# Patient Record
Sex: FEMALE | Race: WHITE | NOT HISPANIC OR LATINO | URBAN - METROPOLITAN AREA
[De-identification: names, ages, dates, MRNs, and addresses within clinical notes are randomized per-mention and may not be internally consistent; named-entity substitution may affect disease eponyms.]

---

## 2017-01-09 ENCOUNTER — APPOINTMENT (OUTPATIENT)
Dept: WOMENS IMAGING | Facility: HOSPITAL | Age: 52
End: 2017-01-09

## 2017-01-09 PROCEDURE — 77067 SCR MAMMO BI INCL CAD: CPT | Performed by: RADIOLOGY

## 2017-01-09 PROCEDURE — 77063 BREAST TOMOSYNTHESIS BI: CPT | Performed by: RADIOLOGY

## 2018-09-04 ENCOUNTER — APPOINTMENT (OUTPATIENT)
Dept: WOMENS IMAGING | Facility: HOSPITAL | Age: 53
End: 2018-09-04

## 2018-09-04 PROCEDURE — 77067 SCR MAMMO BI INCL CAD: CPT | Performed by: RADIOLOGY

## 2018-09-04 PROCEDURE — 77063 BREAST TOMOSYNTHESIS BI: CPT | Performed by: RADIOLOGY

## 2019-05-28 ENCOUNTER — OFFICE VISIT (OUTPATIENT)
Dept: SURGERY | Facility: CLINIC | Age: 54
End: 2019-05-28

## 2019-05-28 VITALS
HEIGHT: 59 IN | WEIGHT: 140 LBS | DIASTOLIC BLOOD PRESSURE: 86 MMHG | BODY MASS INDEX: 28.22 KG/M2 | HEART RATE: 92 BPM | SYSTOLIC BLOOD PRESSURE: 134 MMHG

## 2019-05-28 DIAGNOSIS — K82.8 BILIARY DYSKINESIA: Primary | ICD-10-CM

## 2019-05-28 PROCEDURE — 99203 OFFICE O/P NEW LOW 30 MIN: CPT | Performed by: SURGERY

## 2019-05-28 RX ORDER — LOSARTAN POTASSIUM 25 MG/1
25 TABLET ORAL DAILY PRN
Refills: 3 | COMMUNITY
Start: 2019-05-10

## 2019-05-28 NOTE — PROGRESS NOTES
Subjective   Suellen Miller is a 53 y.o. female is being seen for consultation today at the request of Provider, No Known    Suellen Miller is a 53 y.o. female 53-year-old female with right upper quadrant pain after fatty and greasy meals.  Pain radiates to the right back and shoulder.  Patient has previous laparoscopic surgery related to ovarian cyst.  She has an infra umbilical incision.  The patient had a scan shows ejection fraction less than 35%.  She is on no anticoagulation.        Past Medical History:   Diagnosis Date   • Hypertension        Family History   Problem Relation Age of Onset   • Cancer Mother    • Heart disease Father    • Diabetes Father    • Cancer Father    • Hypertension Neg Hx        Social History     Socioeconomic History   • Marital status: Unknown     Spouse name: Not on file   • Number of children: Not on file   • Years of education: Not on file   • Highest education level: Not on file   Tobacco Use   • Smoking status: Never Smoker   • Smokeless tobacco: Never Used   Substance and Sexual Activity   • Alcohol use: No     Frequency: Never   • Drug use: No   • Sexual activity: No       Past Surgical History:   Procedure Laterality Date   • COLONOSCOPY     • DIAGNOSTIC LAPAROSCOPY         Review of Systems   Constitutional: Negative for activity change, appetite change, chills and fever.   HENT: Negative for sore throat and trouble swallowing.    Eyes: Negative for visual disturbance.   Respiratory: Negative for cough and shortness of breath.    Cardiovascular: Negative for chest pain and palpitations.   Gastrointestinal: Positive for abdominal pain and nausea. Negative for abdominal distention, blood in stool, constipation, diarrhea and vomiting.   Endocrine: Negative for cold intolerance and heat intolerance.   Genitourinary: Negative for dysuria.   Musculoskeletal: Negative for joint swelling.   Skin: Negative for color change, rash and wound.   Allergic/Immunologic: Negative for  "immunocompromised state.   Neurological: Negative for dizziness, seizures, weakness and headaches.   Hematological: Negative for adenopathy. Does not bruise/bleed easily.   Psychiatric/Behavioral: Negative for agitation and confusion.         /86   Pulse 92   Ht 149.9 cm (59\")   Wt 63.5 kg (140 lb)   LMP  (LMP Unknown)   BMI 28.28 kg/m²   Objective   Physical Exam   Constitutional: She is oriented to person, place, and time. She appears well-developed.   HENT:   Head: Normocephalic and atraumatic.   Mouth/Throat: Mucous membranes are normal.   Eyes: Conjunctivae are normal. Pupils are equal, round, and reactive to light.   Neck: Neck supple. No JVD present. No tracheal deviation present. No thyromegaly present.   Cardiovascular: Normal rate and regular rhythm. Exam reveals no gallop and no friction rub.   No murmur heard.  Pulmonary/Chest: Effort normal and breath sounds normal.   Abdominal: Soft. She exhibits no distension. There is no splenomegaly or hepatomegaly. There is no tenderness. No hernia.   Musculoskeletal: Normal range of motion. She exhibits no deformity.   Neurological: She is alert and oriented to person, place, and time.   Skin: Skin is warm and dry.   Psychiatric: She has a normal mood and affect.             Assessment   Suellen was seen today for biliary dyskinesia.    Diagnoses and all orders for this visit:    Biliary dyskinesia  -     Case Request; Standing  -     CBC and Differential; Future  -     Comprehensive metabolic panel; Future  -     metroNIDAZOLE (FLAGYL) IVPB 500 mg  -     Case Request    Other orders  -     Obtain informed consent  -     Provide NPO Instructions to Patient; Future  -     Clorhexidine skin prep  -     Follow Anesthesia Guidelines / Standing Orders; Standing  -     Verify NPO Status; Standing  -     Obtain informed consent; Standing  -     SCD (sequential compression device)- to be placed on patient in Pre-op; Standing  -     Verify / Perform Chlorhexidine " Skin Prep if Indicated (If Not Already Completed); Standing  -     Instructions on coughing, deep breathing, and incentive spirometry.; Standing      Suellen Miller is a 53 y.o. female with biliary dyskinesia.  The risks and benefits of surgery have been discussed with the patient and she will proceed with robotic cholecystectomy and is aware the risks and benefits of the procedure.    Patient's Body mass index is 28.28 kg/m². BMI is above normal parameters. Recommendations include: educational material.

## 2019-05-30 ENCOUNTER — APPOINTMENT (OUTPATIENT)
Dept: PREADMISSION TESTING | Facility: HOSPITAL | Age: 54
End: 2019-05-30

## 2019-05-30 DIAGNOSIS — K82.8 BILIARY DYSKINESIA: ICD-10-CM

## 2019-05-30 LAB
ALBUMIN SERPL-MCNC: 5.14 G/DL (ref 3.5–5.2)
ALBUMIN/GLOB SERPL: 1.9 G/DL
ALP SERPL-CCNC: 91 U/L (ref 39–117)
ALT SERPL W P-5'-P-CCNC: 36 U/L (ref 1–33)
ANION GAP SERPL CALCULATED.3IONS-SCNC: 12.8 MMOL/L
AST SERPL-CCNC: 22 U/L (ref 1–32)
BASOPHILS # BLD AUTO: 0.04 10*3/MM3 (ref 0–0.2)
BASOPHILS NFR BLD AUTO: 0.7 % (ref 0–1.5)
BILIRUB SERPL-MCNC: 0.4 MG/DL (ref 0.2–1.2)
BUN BLD-MCNC: 14 MG/DL (ref 6–20)
BUN/CREAT SERPL: 15.7 (ref 7–25)
CALCIUM SPEC-SCNC: 10.3 MG/DL (ref 8.6–10.5)
CHLORIDE SERPL-SCNC: 103 MMOL/L (ref 98–107)
CO2 SERPL-SCNC: 27.2 MMOL/L (ref 22–29)
CREAT BLD-MCNC: 0.89 MG/DL (ref 0.57–1)
DEPRECATED RDW RBC AUTO: 43 FL (ref 37–54)
EOSINOPHIL # BLD AUTO: 0.06 10*3/MM3 (ref 0–0.4)
EOSINOPHIL NFR BLD AUTO: 1.1 % (ref 0.3–6.2)
ERYTHROCYTE [DISTWIDTH] IN BLOOD BY AUTOMATED COUNT: 13.2 % (ref 12.3–15.4)
GFR SERPL CREATININE-BSD FRML MDRD: 66 ML/MIN/1.73
GLOBULIN UR ELPH-MCNC: 2.7 GM/DL
GLUCOSE BLD-MCNC: 126 MG/DL (ref 65–99)
HCT VFR BLD AUTO: 46.3 % (ref 34–46.6)
HGB BLD-MCNC: 14.9 G/DL (ref 12–15.9)
IMM GRANULOCYTES # BLD AUTO: 0 10*3/MM3 (ref 0–0.05)
IMM GRANULOCYTES NFR BLD AUTO: 0 % (ref 0–0.5)
LYMPHOCYTES # BLD AUTO: 1.63 10*3/MM3 (ref 0.7–3.1)
LYMPHOCYTES NFR BLD AUTO: 30.3 % (ref 19.6–45.3)
MCH RBC QN AUTO: 29 PG (ref 26.6–33)
MCHC RBC AUTO-ENTMCNC: 32.2 G/DL (ref 31.5–35.7)
MCV RBC AUTO: 90.1 FL (ref 79–97)
MONOCYTES # BLD AUTO: 0.46 10*3/MM3 (ref 0.1–0.9)
MONOCYTES NFR BLD AUTO: 8.6 % (ref 5–12)
NEUTROPHILS # BLD AUTO: 3.19 10*3/MM3 (ref 1.7–7)
NEUTROPHILS NFR BLD AUTO: 59.3 % (ref 42.7–76)
PLATELET # BLD AUTO: 291 10*3/MM3 (ref 140–450)
PMV BLD AUTO: 11.3 FL (ref 6–12)
POTASSIUM BLD-SCNC: 4.3 MMOL/L (ref 3.5–5.2)
PROT SERPL-MCNC: 7.8 G/DL (ref 6–8.5)
RBC # BLD AUTO: 5.14 10*6/MM3 (ref 3.77–5.28)
SODIUM BLD-SCNC: 143 MMOL/L (ref 136–145)
WBC NRBC COR # BLD: 5.38 10*3/MM3 (ref 3.4–10.8)

## 2019-05-30 PROCEDURE — 80053 COMPREHEN METABOLIC PANEL: CPT | Performed by: SURGERY

## 2019-05-30 PROCEDURE — 85025 COMPLETE CBC W/AUTO DIFF WBC: CPT | Performed by: SURGERY

## 2019-05-30 PROCEDURE — 36415 COLL VENOUS BLD VENIPUNCTURE: CPT

## 2019-06-07 ENCOUNTER — ANESTHESIA EVENT (OUTPATIENT)
Dept: PERIOP | Facility: HOSPITAL | Age: 54
End: 2019-06-07

## 2019-06-07 ENCOUNTER — ANESTHESIA (OUTPATIENT)
Dept: PERIOP | Facility: HOSPITAL | Age: 54
End: 2019-06-07

## 2019-06-07 ENCOUNTER — HOSPITAL ENCOUNTER (OUTPATIENT)
Facility: HOSPITAL | Age: 54
Setting detail: HOSPITAL OUTPATIENT SURGERY
Discharge: HOME | End: 2019-06-07
Attending: SURGERY | Admitting: SURGERY

## 2019-06-07 VITALS
RESPIRATION RATE: 16 BRPM | TEMPERATURE: 97.8 F | HEART RATE: 74 BPM | SYSTOLIC BLOOD PRESSURE: 142 MMHG | OXYGEN SATURATION: 99 % | DIASTOLIC BLOOD PRESSURE: 86 MMHG

## 2019-06-07 DIAGNOSIS — K82.8 BILIARY DYSKINESIA: ICD-10-CM

## 2019-06-07 PROCEDURE — 25010000002 NEOSTIGMINE 10 MG/10ML SOLUTION: Performed by: NURSE ANESTHETIST, CERTIFIED REGISTERED

## 2019-06-07 PROCEDURE — 25010000002 FENTANYL CITRATE (PF) 100 MCG/2ML SOLUTION: Performed by: NURSE ANESTHETIST, CERTIFIED REGISTERED

## 2019-06-07 PROCEDURE — 25010000002 KETOROLAC TROMETHAMINE PER 15 MG: Performed by: NURSE ANESTHETIST, CERTIFIED REGISTERED

## 2019-06-07 PROCEDURE — 25010000002 DEXAMETHASONE PER 1 MG: Performed by: NURSE ANESTHETIST, CERTIFIED REGISTERED

## 2019-06-07 PROCEDURE — 25010000002 PROPOFOL 10 MG/ML EMULSION: Performed by: NURSE ANESTHETIST, CERTIFIED REGISTERED

## 2019-06-07 PROCEDURE — 94799 UNLISTED PULMONARY SVC/PX: CPT

## 2019-06-07 PROCEDURE — 47562 LAPAROSCOPIC CHOLECYSTECTOMY: CPT | Performed by: SURGERY

## 2019-06-07 PROCEDURE — S2900 ROBOTIC SURGICAL SYSTEM: HCPCS | Performed by: SURGERY

## 2019-06-07 PROCEDURE — 25010000002 ONDANSETRON PER 1 MG: Performed by: NURSE ANESTHETIST, CERTIFIED REGISTERED

## 2019-06-07 RX ORDER — NEOSTIGMINE METHYLSULFATE 1 MG/ML
INJECTION, SOLUTION INTRAVENOUS AS NEEDED
Status: DISCONTINUED | OUTPATIENT
Start: 2019-06-07 | End: 2019-06-07 | Stop reason: SURG

## 2019-06-07 RX ORDER — FENTANYL CITRATE 50 UG/ML
INJECTION, SOLUTION INTRAMUSCULAR; INTRAVENOUS AS NEEDED
Status: DISCONTINUED | OUTPATIENT
Start: 2019-06-07 | End: 2019-06-07 | Stop reason: SURG

## 2019-06-07 RX ORDER — GLYCOPYRROLATE 0.2 MG/ML
INJECTION INTRAMUSCULAR; INTRAVENOUS AS NEEDED
Status: DISCONTINUED | OUTPATIENT
Start: 2019-06-07 | End: 2019-06-07 | Stop reason: SURG

## 2019-06-07 RX ORDER — LABETALOL HYDROCHLORIDE 5 MG/ML
INJECTION, SOLUTION INTRAVENOUS AS NEEDED
Status: DISCONTINUED | OUTPATIENT
Start: 2019-06-07 | End: 2019-06-07 | Stop reason: SURG

## 2019-06-07 RX ORDER — FAMOTIDINE 10 MG/ML
INJECTION, SOLUTION INTRAVENOUS AS NEEDED
Status: DISCONTINUED | OUTPATIENT
Start: 2019-06-07 | End: 2019-06-07 | Stop reason: SURG

## 2019-06-07 RX ORDER — MEPERIDINE HYDROCHLORIDE 25 MG/ML
12.5 INJECTION INTRAMUSCULAR; INTRAVENOUS; SUBCUTANEOUS
Status: DISCONTINUED | OUTPATIENT
Start: 2019-06-07 | End: 2019-06-07 | Stop reason: HOSPADM

## 2019-06-07 RX ORDER — SODIUM CHLORIDE 0.9 % (FLUSH) 0.9 %
3 SYRINGE (ML) INJECTION EVERY 12 HOURS SCHEDULED
Status: DISCONTINUED | OUTPATIENT
Start: 2019-06-07 | End: 2019-06-07 | Stop reason: HOSPADM

## 2019-06-07 RX ORDER — HYDROCODONE BITARTRATE AND ACETAMINOPHEN 5; 325 MG/1; MG/1
1 TABLET ORAL EVERY 6 HOURS PRN
Qty: 10 TABLET | Refills: 0 | Status: SHIPPED | OUTPATIENT
Start: 2019-06-07 | End: 2022-12-12

## 2019-06-07 RX ORDER — VECURONIUM BROMIDE 1 MG/ML
INJECTION, POWDER, LYOPHILIZED, FOR SOLUTION INTRAVENOUS AS NEEDED
Status: DISCONTINUED | OUTPATIENT
Start: 2019-06-07 | End: 2019-06-07 | Stop reason: SURG

## 2019-06-07 RX ORDER — OXYCODONE HYDROCHLORIDE AND ACETAMINOPHEN 5; 325 MG/1; MG/1
1 TABLET ORAL ONCE AS NEEDED
Status: DISCONTINUED | OUTPATIENT
Start: 2019-06-07 | End: 2019-06-07 | Stop reason: HOSPADM

## 2019-06-07 RX ORDER — ONDANSETRON 2 MG/ML
INJECTION INTRAMUSCULAR; INTRAVENOUS AS NEEDED
Status: DISCONTINUED | OUTPATIENT
Start: 2019-06-07 | End: 2019-06-07 | Stop reason: SURG

## 2019-06-07 RX ORDER — FENTANYL CITRATE 50 UG/ML
50 INJECTION, SOLUTION INTRAMUSCULAR; INTRAVENOUS
Status: DISCONTINUED | OUTPATIENT
Start: 2019-06-07 | End: 2019-06-07 | Stop reason: HOSPADM

## 2019-06-07 RX ORDER — KETOROLAC TROMETHAMINE 30 MG/ML
INJECTION, SOLUTION INTRAMUSCULAR; INTRAVENOUS AS NEEDED
Status: DISCONTINUED | OUTPATIENT
Start: 2019-06-07 | End: 2019-06-07 | Stop reason: SURG

## 2019-06-07 RX ORDER — PROPOFOL 10 MG/ML
VIAL (ML) INTRAVENOUS AS NEEDED
Status: DISCONTINUED | OUTPATIENT
Start: 2019-06-07 | End: 2019-06-07 | Stop reason: SURG

## 2019-06-07 RX ORDER — MAGNESIUM HYDROXIDE 1200 MG/15ML
LIQUID ORAL AS NEEDED
Status: DISCONTINUED | OUTPATIENT
Start: 2019-06-07 | End: 2019-06-07 | Stop reason: HOSPADM

## 2019-06-07 RX ORDER — DEXAMETHASONE SODIUM PHOSPHATE 10 MG/ML
INJECTION INTRAMUSCULAR; INTRAVENOUS AS NEEDED
Status: DISCONTINUED | OUTPATIENT
Start: 2019-06-07 | End: 2019-06-07 | Stop reason: SURG

## 2019-06-07 RX ORDER — SODIUM CHLORIDE, SODIUM LACTATE, POTASSIUM CHLORIDE, CALCIUM CHLORIDE 600; 310; 30; 20 MG/100ML; MG/100ML; MG/100ML; MG/100ML
125 INJECTION, SOLUTION INTRAVENOUS CONTINUOUS
Status: DISCONTINUED | OUTPATIENT
Start: 2019-06-07 | End: 2019-06-07 | Stop reason: HOSPADM

## 2019-06-07 RX ORDER — SODIUM CHLORIDE 0.9 % (FLUSH) 0.9 %
3-10 SYRINGE (ML) INJECTION AS NEEDED
Status: DISCONTINUED | OUTPATIENT
Start: 2019-06-07 | End: 2019-06-07 | Stop reason: HOSPADM

## 2019-06-07 RX ORDER — IPRATROPIUM BROMIDE AND ALBUTEROL SULFATE 2.5; .5 MG/3ML; MG/3ML
3 SOLUTION RESPIRATORY (INHALATION) ONCE AS NEEDED
Status: DISCONTINUED | OUTPATIENT
Start: 2019-06-07 | End: 2019-06-07 | Stop reason: HOSPADM

## 2019-06-07 RX ORDER — LIDOCAINE HYDROCHLORIDE 20 MG/ML
INJECTION, SOLUTION INFILTRATION; PERINEURAL AS NEEDED
Status: DISCONTINUED | OUTPATIENT
Start: 2019-06-07 | End: 2019-06-07 | Stop reason: SURG

## 2019-06-07 RX ORDER — ONDANSETRON 2 MG/ML
4 INJECTION INTRAMUSCULAR; INTRAVENOUS ONCE AS NEEDED
Status: DISCONTINUED | OUTPATIENT
Start: 2019-06-07 | End: 2019-06-07 | Stop reason: HOSPADM

## 2019-06-07 RX ORDER — BUPIVACAINE HYDROCHLORIDE AND EPINEPHRINE 5; 5 MG/ML; UG/ML
INJECTION, SOLUTION EPIDURAL; INTRACAUDAL; PERINEURAL AS NEEDED
Status: DISCONTINUED | OUTPATIENT
Start: 2019-06-07 | End: 2019-06-07 | Stop reason: HOSPADM

## 2019-06-07 RX ADMIN — NEOSTIGMINE METHYLSULFATE 2 MG: 1 INJECTION, SOLUTION INTRAVENOUS at 14:11

## 2019-06-07 RX ADMIN — FENTANYL CITRATE 50 MCG: 50 INJECTION INTRAMUSCULAR; INTRAVENOUS at 14:15

## 2019-06-07 RX ADMIN — FENTANYL CITRATE 100 MCG: 50 INJECTION INTRAMUSCULAR; INTRAVENOUS at 13:13

## 2019-06-07 RX ADMIN — FENTANYL CITRATE 50 MCG: 50 INJECTION INTRAMUSCULAR; INTRAVENOUS at 14:13

## 2019-06-07 RX ADMIN — PROPOFOL 150 MG: 10 INJECTION, EMULSION INTRAVENOUS at 13:13

## 2019-06-07 RX ADMIN — VECURONIUM BROMIDE 3 MG: 1 INJECTION, POWDER, LYOPHILIZED, FOR SOLUTION INTRAVENOUS at 13:13

## 2019-06-07 RX ADMIN — METRONIDAZOLE 500 MG: 500 INJECTION, SOLUTION INTRAVENOUS at 13:06

## 2019-06-07 RX ADMIN — ONDANSETRON 4 MG: 2 INJECTION, SOLUTION INTRAMUSCULAR; INTRAVENOUS at 13:06

## 2019-06-07 RX ADMIN — DEXAMETHASONE SODIUM PHOSPHATE 8 MG: 10 INJECTION INTRAMUSCULAR; INTRAVENOUS at 13:24

## 2019-06-07 RX ADMIN — GLYCOPYRROLATE 0.2 MG: 0.2 INJECTION, SOLUTION INTRAMUSCULAR; INTRAVENOUS at 13:29

## 2019-06-07 RX ADMIN — LABETALOL HYDROCHLORIDE 5 MG: 5 INJECTION, SOLUTION INTRAVENOUS at 13:39

## 2019-06-07 RX ADMIN — VECURONIUM BROMIDE 1 MG: 1 INJECTION, POWDER, LYOPHILIZED, FOR SOLUTION INTRAVENOUS at 13:53

## 2019-06-07 RX ADMIN — SODIUM CHLORIDE, POTASSIUM CHLORIDE, SODIUM LACTATE AND CALCIUM CHLORIDE 125 ML/HR: 600; 310; 30; 20 INJECTION, SOLUTION INTRAVENOUS at 12:39

## 2019-06-07 RX ADMIN — LIDOCAINE HYDROCHLORIDE 40 MG: 20 INJECTION, SOLUTION INFILTRATION; PERINEURAL at 13:13

## 2019-06-07 RX ADMIN — GLYCOPYRROLATE 0.2 MG: 0.2 INJECTION, SOLUTION INTRAMUSCULAR; INTRAVENOUS at 14:11

## 2019-06-07 RX ADMIN — KETOROLAC TROMETHAMINE 30 MG: 30 INJECTION, SOLUTION INTRAMUSCULAR; INTRAVENOUS at 14:14

## 2019-06-07 RX ADMIN — FAMOTIDINE 20 MG: 10 INJECTION, SOLUTION INTRAVENOUS at 13:06

## 2019-06-07 NOTE — ANESTHESIA POSTPROCEDURE EVALUATION
Patient: Suellen Miller    Procedure Summary     Date:  06/07/19 Room / Location:  [facility] OR  /  [facility] OR    Anesthesia Start:  1306 Anesthesia Stop:  1427    Procedure:  CHOLECYSTECTOMY LAPAROSCOPIC WITH DAVINCI ROBOT (N/A Abdomen) Diagnosis:       Biliary dyskinesia      (Biliary dyskinesia [K82.8])    Surgeon:  Rolo Sheldon MD Provider:  Esteban Boo MD    Anesthesia Type:  general ASA Status:  2          Anesthesia Type: general  Last vitals  BP   146/81 (06/07/19 1428)   Temp   97.5 °F (36.4 °C) (06/07/19 1428)   Pulse   51 (06/07/19 1428)   Resp   12 (06/07/19 1428)     SpO2   98 % (06/07/19 1428)     Post Anesthesia Care and Evaluation    Patient location during evaluation: PHASE II  Patient participation: complete - patient participated  Level of consciousness: awake and alert  Pain score: 1  Pain management: adequate  Airway patency: patent  Anesthetic complications: No anesthetic complications  PONV Status: controlled  Cardiovascular status: acceptable  Respiratory status: acceptable  Hydration status: acceptable

## 2019-06-07 NOTE — ANESTHESIA PROCEDURE NOTES
Airway  Urgency: elective    Airway not difficult    General Information and Staff    Patient location during procedure: OR  CRNA: Ladi Parr CRNA    Indications and Patient Condition  Indications for airway management: airway protection    Preoxygenated: yes  MILS maintained throughout  Mask difficulty assessment: 1 - vent by mask    Final Airway Details  Final airway type: endotracheal airway      Successful airway: ETT  Cuffed: yes   Successful intubation technique: direct laryngoscopy  Endotracheal tube insertion site: oral  Blade: Meera  Blade size: 3  ETT size (mm): 7.0  Cormack-Lehane Classification: grade IIa - partial view of glottis  Placement verified by: chest auscultation   Number of attempts at approach: 2    Additional Comments  One attempt with Mac 3 and second attempt with CMAC 3.  Small mouth opening

## 2019-06-07 NOTE — OP NOTE
CHOLECYSTECTOMY LAPAROSCOPIC WITH DAVINCI ROBOT  Procedure Note    Suellen Miller  6/7/2019    Pre-op Diagnosis:   Biliary dyskinesia [K82.8]    Post-op Diagnosis:     Post-Op Diagnosis Codes:     * Biliary dyskinesia [K82.8]    Procedure(s):  CHOLECYSTECTOMY LAPAROSCOPIC WITH DAVINCI ROBOT    Surgeon(s):  Rolo Sheldon MD    Anesthesia: General    Staff:   Circulator: Veronica Carlisle RN; Sue Baeza RN  Scrub Person: Elicia Shepard  Assistant: Hardeep Atkinson    Findings: distended gallbladder, critical view of safety obtained    Operative Procedure:  The patient was taken operating suite and placed supine on operating table.  Bilateral sequential compression devices were applied and general endotracheal anesthesia administered.  The abdomen was prepped and draped in usual sterile fashion.  Preoperative antibiotics were confirmed.  Timeout procedure was performed.              A Veress needle was inserted palmers point the left upper quadrant and saline drop test confirmed entry into the peritoneal space.  Pneumoperitoneum was established.  A 5 mm Optiview trocar was inserted at the Veress needle entry site and there is no evidence of underlying injury.  An 8 mm camera trocar was then placed through an infra umbilical incision.  A right lateral 8 mm trocar was placed in the midclavicular line.  The 5 mm left upper quadrant trocar was exchanged for an 8 mm trocar.  The 5 mm trocar was then placed laterally as a retraction port.  The robot was then docked.  The fundus of the gallbladder was grasped and retracted anteriorly and superiorly.  The infundibulum was retracted laterally inferiorly.  The peritoneum on the anterior and posterior surface of the gallbladder was opened with cautery dissection.  The cystic duct and artery were dissected free circumferentially.  The gallbladder was elevated from the gallbladder fossa for portion.  The cystic plate was then visible from the anterior posterior views with  only the cystic duct and artery entering the gallbladder.  With the critical view safety obtained hemoclips were applied distally x1 to the cystic artery and 2 proximal and one distal on the cystic duct.  The cystic artery was then divided with cautery and the cystic duct divided with the hook using the cut setting.  The gallbladder was then removed from the gallbladder fossa intact.  The gallbladder was then removed from the gallbladder fossa with cautery hook.  The gallbladder was placed in an Endo Catch bag was removed through the infraumbilical fascial defect.  The gallbladder fossa was then made hemostatic and all ports removed under direct visualization.  Pneumoperitoneum was evacuated and all skin incisions were closed with Monocryl subcuticular suture after closure of the infra umbilical fascial defect with Vicryl suture.  Counts were correct.        Estimated Blood Loss: 10mL    Specimens:   gallbladder                 Drains: none    Grafts/Implants:  none    Complications: none      Rolo Sheldon MD     Date: 6/7/2019  Time: 2:18 PM

## 2019-06-07 NOTE — ANESTHESIA PREPROCEDURE EVALUATION
Anesthesia Evaluation     Patient summary reviewed and Nursing notes reviewed   history of anesthetic complications: prolonged sedation  NPO Solid Status: > 8 hours  NPO Liquid Status: > 8 hours           Airway   Mallampati: II  TM distance: >3 FB  Neck ROM: full  no difficulty expected  Dental - normal exam     Pulmonary - negative pulmonary ROS and normal exam   (-) asthma, not a smoker  Cardiovascular - normal exam  Exercise tolerance: good (4-7 METS)    NYHA Classification: II    (+) hypertension, hyperlipidemia,   (-) past MI, dysrhythmias, angina, CHF      Neuro/Psych- negative ROS  (-) seizures, CVA  GI/Hepatic/Renal/Endo - negative ROS   (-) GERD, liver disease, no renal disease, diabetes, hypothyroidism    Musculoskeletal (-) negative ROS    Abdominal  - normal exam    Bowel sounds: normal.   Substance History - negative use     OB/GYN negative ob/gyn ROS         Other - negative ROS       (-) arthritis                  Anesthesia Plan    ASA 2     general     intravenous induction   Anesthetic plan, all risks, benefits, and alternatives have been provided, discussed and informed consent has been obtained with: patient and spouse/significant other.  Use of blood products discussed with patient and spouse/significant other  Consented to blood products.

## 2019-06-11 LAB
LAB AP CASE REPORT: NORMAL
PATH REPORT.FINAL DX SPEC: NORMAL

## 2019-06-26 ENCOUNTER — OFFICE VISIT (OUTPATIENT)
Dept: SURGERY | Facility: CLINIC | Age: 54
End: 2019-06-26

## 2019-06-26 VITALS
HEIGHT: 59 IN | WEIGHT: 140 LBS | DIASTOLIC BLOOD PRESSURE: 71 MMHG | HEART RATE: 74 BPM | SYSTOLIC BLOOD PRESSURE: 130 MMHG | BODY MASS INDEX: 28.22 KG/M2

## 2019-06-26 DIAGNOSIS — K82.8 BILIARY DYSKINESIA: Primary | ICD-10-CM

## 2019-06-26 PROCEDURE — 99024 POSTOP FOLLOW-UP VISIT: CPT | Performed by: SURGERY

## 2019-06-28 NOTE — PROGRESS NOTES
Subjective   Suellen Miller is a 53 y.o. female  is here today for follow-up.         Suellen Miller is a 53 y.o. female here for followup after robotic cholecystectomy.  The patient is doing well and tolerating diet.  Their incisions are healing well.  No complaints of pain postoperatively or any other issues reported.    Pathology consistent with chronic cholecystitis    Physical Exam:  NAD, AOx3  RRR  CTAB  S/appropriately tender/incisions healing well          Suellen was seen today for s/p lap delphine.    Diagnoses and all orders for this visit:    Biliary dyskinesia        Assessment     53 y.o. female s/p cholecystectomy secondary to biliary dyskinesia.  The patient is doing well without complication.  She will follow-up as needed.

## 2020-01-06 ENCOUNTER — APPOINTMENT (OUTPATIENT)
Dept: WOMENS IMAGING | Facility: HOSPITAL | Age: 55
End: 2020-01-06

## 2020-01-06 PROCEDURE — 77063 BREAST TOMOSYNTHESIS BI: CPT | Performed by: RADIOLOGY

## 2020-01-06 PROCEDURE — 77067 SCR MAMMO BI INCL CAD: CPT | Performed by: RADIOLOGY

## 2021-03-03 ENCOUNTER — APPOINTMENT (OUTPATIENT)
Dept: WOMENS IMAGING | Facility: HOSPITAL | Age: 56
End: 2021-03-03

## 2021-03-03 PROCEDURE — 77063 BREAST TOMOSYNTHESIS BI: CPT | Performed by: RADIOLOGY

## 2021-03-03 PROCEDURE — 77067 SCR MAMMO BI INCL CAD: CPT | Performed by: RADIOLOGY

## 2021-10-12 ENCOUNTER — OFFICE VISIT (OUTPATIENT)
Dept: SURGERY | Facility: CLINIC | Age: 56
End: 2021-10-12

## 2021-10-12 VITALS
HEART RATE: 92 BPM | DIASTOLIC BLOOD PRESSURE: 84 MMHG | WEIGHT: 136 LBS | HEIGHT: 59 IN | SYSTOLIC BLOOD PRESSURE: 132 MMHG | BODY MASS INDEX: 27.42 KG/M2

## 2021-10-12 DIAGNOSIS — R10.11 RUQ PAIN: Primary | ICD-10-CM

## 2021-10-12 DIAGNOSIS — K82.8 BILIARY DYSKINESIA: ICD-10-CM

## 2021-10-12 PROCEDURE — 99213 OFFICE O/P EST LOW 20 MIN: CPT | Performed by: SURGERY

## 2021-10-12 RX ORDER — DOCUSATE SODIUM 250 MG
250 CAPSULE ORAL 2 TIMES DAILY PRN
Qty: 60 CAPSULE | Refills: 1 | Status: SHIPPED | OUTPATIENT
Start: 2021-10-12

## 2021-10-13 NOTE — PROGRESS NOTES
Subjective   Suellen Miller is a 56 y.o. female is being seen for consultation today at the request of Piter Trevino MD    Suellen Miller is a 56 y.o. female With new onset right upper quadrant pain for the past week.  Pain radiates to the right back and is intermittent and associated with food.  She has undergone cholecystectomy in 2019.  No signs of liver disease or complication from cholecystectomy.  She reports normal daily bowel movements but recently underwent antibiotic therapy and has been taking a probiotic.      Past Medical History:   Diagnosis Date   • Dysfunctional gallbladder    • Elevated cholesterol    • Hypertension        Family History   Problem Relation Age of Onset   • Cancer Mother    • Heart disease Father    • Diabetes Father    • Cancer Father    • Hypertension Neg Hx        Social History     Socioeconomic History   • Marital status:    Tobacco Use   • Smoking status: Never Smoker   • Smokeless tobacco: Never Used   Vaping Use   • Vaping Use: Never used   Substance and Sexual Activity   • Alcohol use: No   • Drug use: No   • Sexual activity: Never       Past Surgical History:   Procedure Laterality Date   • CHOLECYSTECTOMY N/A 6/7/2019    Procedure: CHOLECYSTECTOMY LAPAROSCOPIC WITH DAVINCI SI ROBOT;  Surgeon: Rolo Sheldon MD;  Location: [facility];  Service: DaVinci   • COLONOSCOPY     • DIAGNOSTIC LAPAROSCOPY         Review of Systems   Constitutional: Negative for activity change, appetite change, chills and fever.   HENT: Negative for sore throat and trouble swallowing.    Eyes: Negative for visual disturbance.   Respiratory: Negative for cough and shortness of breath.    Cardiovascular: Negative for chest pain and palpitations.   Gastrointestinal: Positive for abdominal pain. Negative for abdominal distention, blood in stool, constipation, diarrhea, nausea and vomiting.   Endocrine: Negative for cold intolerance and heat intolerance.   Genitourinary: Negative for  "dysuria.   Musculoskeletal: Negative for joint swelling.   Skin: Negative for color change, rash and wound.   Allergic/Immunologic: Negative for immunocompromised state.   Neurological: Negative for dizziness, seizures, weakness and headaches.   Hematological: Negative for adenopathy. Does not bruise/bleed easily.   Psychiatric/Behavioral: Negative for agitation and confusion.         /84   Pulse 92   Ht 149.9 cm (59.02\")   Wt 61.7 kg (136 lb)   LMP  (LMP Unknown)   BMI 27.45 kg/m²   Objective   Physical Exam  Constitutional:       Appearance: She is well-developed.   HENT:      Head: Normocephalic and atraumatic.   Eyes:      Conjunctiva/sclera: Conjunctivae normal.      Pupils: Pupils are equal, round, and reactive to light.   Neck:      Thyroid: No thyromegaly.      Vascular: No JVD.      Trachea: No tracheal deviation.   Cardiovascular:      Rate and Rhythm: Normal rate and regular rhythm.      Heart sounds: No murmur heard.  No friction rub. No gallop.    Pulmonary:      Effort: Pulmonary effort is normal.      Breath sounds: Normal breath sounds.   Abdominal:      General: There is no distension.      Palpations: Abdomen is soft. There is no hepatomegaly or splenomegaly.      Tenderness: There is no abdominal tenderness.      Hernia: No hernia is present.   Musculoskeletal:         General: No deformity. Normal range of motion.      Cervical back: Neck supple.   Skin:     General: Skin is warm and dry.   Neurological:      Mental Status: She is alert and oriented to person, place, and time.               Assessment   Diagnoses and all orders for this visit:    1. RUQ pain (Primary)  -     XR abdomen kub; Future    2. Biliary dyskinesia    Other orders  -     SCANNED - LABS  -     docusate sodium (COLACE) 250 MG capsule; Take 1 capsule by mouth 2 (Two) Times a Day As Needed for Constipation.  Dispense: 60 capsule; Refill: 1      Suellen Miller is a 56 y.o. female with right upper quadrant pain.  She is " well out from her cholecystectomy and has no signs of there is complication related to cholecystectomy.  She has recently been on antibiotics secondary to Covid pneumonia from which she is now out of quarantine.  I believe she is suffering from constipation and KUB confirms constipation.  Small bowel regimen of stool softeners fiber and water have been prescribed for the patient.  Follow-up in 2 weeks.  Patient's Body mass index is 27.45 kg/m². indicating that she is within normal range (BMI 18.5-24.9). No BMI management plan needed..

## 2021-10-20 ENCOUNTER — OFFICE VISIT (OUTPATIENT)
Dept: SURGERY | Facility: CLINIC | Age: 56
End: 2021-10-20

## 2021-10-20 VITALS — HEIGHT: 59 IN | BODY MASS INDEX: 27.42 KG/M2 | WEIGHT: 136 LBS

## 2021-10-20 DIAGNOSIS — K82.8 BILIARY DYSKINESIA: Primary | ICD-10-CM

## 2021-10-20 PROCEDURE — 99213 OFFICE O/P EST LOW 20 MIN: CPT | Performed by: SURGERY

## 2021-10-21 NOTE — PROGRESS NOTES
Subjective   Suellen Miller is a 56 y.o. female is being seen for consultation today at the request of Piter Trevino MD    Suellen Miller is a 56 y.o. female With new onset right upper quadrant pain for the past week.  Pain radiates to the right back and is intermittent and associated with food.  She has undergone cholecystectomy in 2019.  No signs of liver disease or complication from cholecystectomy.  She reports normal daily bowel movements but recently underwent antibiotic therapy and has been taking a probiotic.  Her bowel movements have improved with stool softener but we will arrange her regimen to accommodate her current schedule.      Past Medical History:   Diagnosis Date   • Dysfunctional gallbladder    • Elevated cholesterol    • Hypertension        Family History   Problem Relation Age of Onset   • Cancer Mother    • Heart disease Father    • Diabetes Father    • Cancer Father    • Hypertension Neg Hx        Social History     Socioeconomic History   • Marital status:    Tobacco Use   • Smoking status: Never Smoker   • Smokeless tobacco: Never Used   Vaping Use   • Vaping Use: Never used   Substance and Sexual Activity   • Alcohol use: No   • Drug use: No   • Sexual activity: Never       Past Surgical History:   Procedure Laterality Date   • CHOLECYSTECTOMY N/A 6/7/2019    Procedure: CHOLECYSTECTOMY LAPAROSCOPIC WITH DAVINCI SI ROBOT;  Surgeon: Rolo Sheldon MD;  Location: [facility];  Service: DaVinci   • COLONOSCOPY     • DIAGNOSTIC LAPAROSCOPY         Review of Systems   Constitutional: Negative for activity change, appetite change, chills and fever.   HENT: Negative for sore throat and trouble swallowing.    Eyes: Negative for visual disturbance.   Respiratory: Negative for cough and shortness of breath.    Cardiovascular: Negative for chest pain and palpitations.   Gastrointestinal: Positive for abdominal pain. Negative for abdominal distention, blood in stool, constipation,  "diarrhea, nausea and vomiting.   Endocrine: Negative for cold intolerance and heat intolerance.   Genitourinary: Negative for dysuria.   Musculoskeletal: Negative for joint swelling.   Skin: Negative for color change, rash and wound.   Allergic/Immunologic: Negative for immunocompromised state.   Neurological: Negative for dizziness, seizures, weakness and headaches.   Hematological: Negative for adenopathy. Does not bruise/bleed easily.   Psychiatric/Behavioral: Negative for agitation and confusion.         Ht 149.9 cm (59.02\")   Wt 61.7 kg (136 lb)   LMP  (LMP Unknown)   BMI 27.45 kg/m²   Objective   Physical Exam  Constitutional:       Appearance: She is well-developed.   HENT:      Head: Normocephalic and atraumatic.   Eyes:      Conjunctiva/sclera: Conjunctivae normal.      Pupils: Pupils are equal, round, and reactive to light.   Neck:      Thyroid: No thyromegaly.      Vascular: No JVD.      Trachea: No tracheal deviation.   Cardiovascular:      Rate and Rhythm: Normal rate and regular rhythm.      Heart sounds: No murmur heard.  No friction rub. No gallop.    Pulmonary:      Effort: Pulmonary effort is normal.      Breath sounds: Normal breath sounds.   Abdominal:      General: There is no distension.      Palpations: Abdomen is soft. There is no hepatomegaly or splenomegaly.      Tenderness: There is no abdominal tenderness.      Hernia: No hernia is present.   Musculoskeletal:         General: No deformity. Normal range of motion.      Cervical back: Neck supple.   Skin:     General: Skin is warm and dry.   Neurological:      Mental Status: She is alert and oriented to person, place, and time.               Assessment   Diagnoses and all orders for this visit:    1. Biliary dyskinesia (Primary)      Suellen Miller is a 56 y.o. female with right upper quadrant pain.  She is well out from her cholecystectomy and has no signs of there is complication related to cholecystectomy.  She has recently been on " antibiotics secondary to Covid pneumonia from which she is now out of quarantine.  I believe she is suffering from constipation and KUB confirms constipation.  Small bowel regimen of stool softeners fiber and water have been prescribed for the patient.  She will continue current bowel regimen and follow-up in 1 month.        Patient's Body mass index is 27.45 kg/m². indicating that she is within normal range (BMI 18.5-24.9). No BMI management plan needed..

## 2021-11-16 ENCOUNTER — OFFICE VISIT (OUTPATIENT)
Dept: SURGERY | Facility: CLINIC | Age: 56
End: 2021-11-16

## 2021-11-16 VITALS — WEIGHT: 136 LBS | BODY MASS INDEX: 27.42 KG/M2 | HEIGHT: 59 IN

## 2021-11-16 DIAGNOSIS — K82.8 BILIARY DYSKINESIA: Primary | ICD-10-CM

## 2021-11-16 PROCEDURE — 99212 OFFICE O/P EST SF 10 MIN: CPT | Performed by: SURGERY

## 2021-11-17 NOTE — PROGRESS NOTES
Subjective   Suellen iMller is a 56 y.o. female is being seen for consultation today at the request of Piter Trevino MD    Suellen Miller is a 56 y.o. female With new onset right upper quadrant pain for the past week.  Pain radiates to the right back and is intermittent and associated with food.  She has undergone cholecystectomy in 2019.  No signs of liver disease or complication from cholecystectomy.  She reports normal daily bowel movements but recently underwent antibiotic therapy and has been taking a probiotic.  Her bowel movements have improved with stool softener but we will arrange her regimen to accommodate her current schedule.      Past Medical History:   Diagnosis Date   • Dysfunctional gallbladder    • Elevated cholesterol    • Hypertension        Family History   Problem Relation Age of Onset   • Cancer Mother    • Heart disease Father    • Diabetes Father    • Cancer Father    • Hypertension Neg Hx        Social History     Socioeconomic History   • Marital status:    Tobacco Use   • Smoking status: Never Smoker   • Smokeless tobacco: Never Used   Vaping Use   • Vaping Use: Never used   Substance and Sexual Activity   • Alcohol use: No   • Drug use: No   • Sexual activity: Never       Past Surgical History:   Procedure Laterality Date   • CHOLECYSTECTOMY N/A 6/7/2019    Procedure: CHOLECYSTECTOMY LAPAROSCOPIC WITH DAVINCI SI ROBOT;  Surgeon: Rolo Sheldon MD;  Location: [facility];  Service: DaVinci   • COLONOSCOPY     • DIAGNOSTIC LAPAROSCOPY         Review of Systems   Constitutional: Negative for activity change, appetite change, chills and fever.   HENT: Negative for sore throat and trouble swallowing.    Eyes: Negative for visual disturbance.   Respiratory: Negative for cough and shortness of breath.    Cardiovascular: Negative for chest pain and palpitations.   Gastrointestinal: Positive for abdominal pain. Negative for abdominal distention, blood in stool, constipation,  "diarrhea, nausea and vomiting.   Endocrine: Negative for cold intolerance and heat intolerance.   Genitourinary: Negative for dysuria.   Musculoskeletal: Negative for joint swelling.   Skin: Negative for color change, rash and wound.   Allergic/Immunologic: Negative for immunocompromised state.   Neurological: Negative for dizziness, seizures, weakness and headaches.   Hematological: Negative for adenopathy. Does not bruise/bleed easily.   Psychiatric/Behavioral: Negative for agitation and confusion.         Ht 149.9 cm (59.02\")   Wt 61.7 kg (136 lb)   LMP  (LMP Unknown)   BMI 27.45 kg/m²   Objective   Physical Exam  Constitutional:       Appearance: She is well-developed.   HENT:      Head: Normocephalic and atraumatic.   Eyes:      Conjunctiva/sclera: Conjunctivae normal.      Pupils: Pupils are equal, round, and reactive to light.   Neck:      Thyroid: No thyromegaly.      Vascular: No JVD.      Trachea: No tracheal deviation.   Cardiovascular:      Rate and Rhythm: Normal rate and regular rhythm.      Heart sounds: No murmur heard.  No friction rub. No gallop.    Pulmonary:      Effort: Pulmonary effort is normal.      Breath sounds: Normal breath sounds.   Abdominal:      General: There is no distension.      Palpations: Abdomen is soft. There is no hepatomegaly or splenomegaly.      Tenderness: There is no abdominal tenderness.      Hernia: No hernia is present.   Musculoskeletal:         General: No deformity. Normal range of motion.      Cervical back: Neck supple.   Skin:     General: Skin is warm and dry.   Neurological:      Mental Status: She is alert and oriented to person, place, and time.               Assessment   Diagnoses and all orders for this visit:    1. Biliary dyskinesia (Primary)      Suellen Miller is a 56 y.o. female with right upper quadrant pain.  She is well out from her cholecystectomy and has no signs of there is complication related to cholecystectomy.  She has recently been on " antibiotics secondary to Covid pneumonia from which she is now out of quarantine.  I believe she is suffering from constipation and KUB confirms constipation.  Small bowel regimen of stool softeners fiber and water have been prescribed for the patient.  She will continue current bowel regimen and follow-up PRN.        Patient's Body mass index is 27.45 kg/m². indicating that she is within normal range (BMI 18.5-24.9). No BMI management plan needed..

## 2022-05-17 ENCOUNTER — OFFICE VISIT (OUTPATIENT)
Dept: SURGERY | Facility: CLINIC | Age: 57
End: 2022-05-17

## 2022-05-17 VITALS — WEIGHT: 136 LBS | BODY MASS INDEX: 27.42 KG/M2 | HEIGHT: 59 IN

## 2022-05-17 DIAGNOSIS — K62.5 BRIGHT RED BLOOD PER RECTUM: Primary | ICD-10-CM

## 2022-05-17 PROCEDURE — 99242 OFF/OP CONSLTJ NEW/EST SF 20: CPT | Performed by: SURGERY

## 2022-05-17 RX ORDER — LOSARTAN POTASSIUM 50 MG/1
50 TABLET ORAL DAILY
COMMUNITY
End: 2022-12-12

## 2022-05-17 RX ORDER — PRAMOXINE HYDROCHLORIDE 10 MG/G
AEROSOL, FOAM TOPICAL EVERY 4 HOURS PRN
Qty: 15 G | Refills: 0 | Status: SHIPPED | OUTPATIENT
Start: 2022-05-17 | End: 2022-12-12

## 2022-05-18 NOTE — PROGRESS NOTES
Subjective   Suellen Miller is a 56 y.o. female is being seen for consultation today at the request of Piter Trevino MD    Suellen Miller is a 56 y.o. female With rectal bleeding.  She had painless bright red blood per rectum last week that lasted for 1 to 2 days but has stopped since.  No palpable masses.  No previous episodes.  No hemodynamic instability or need for transfusion.  Last colonoscopy was 2 to 3 years ago.      Past Medical History:   Diagnosis Date   • Dysfunctional gallbladder    • Elevated cholesterol    • Hypertension        Family History   Problem Relation Age of Onset   • Cancer Mother    • Heart disease Father    • Diabetes Father    • Cancer Father    • Hypertension Neg Hx        Social History     Socioeconomic History   • Marital status:    Tobacco Use   • Smoking status: Never Smoker   • Smokeless tobacco: Never Used   Vaping Use   • Vaping Use: Never used   Substance and Sexual Activity   • Alcohol use: No   • Drug use: No   • Sexual activity: Never       Past Surgical History:   Procedure Laterality Date   • CHOLECYSTECTOMY N/A 6/7/2019    Procedure: CHOLECYSTECTOMY LAPAROSCOPIC WITH DAVINCI SI ROBOT;  Surgeon: Rolo Sheldon MD;  Location: [facility];  Service: DaVinci   • COLONOSCOPY     • DIAGNOSTIC LAPAROSCOPY         Review of Systems   Constitutional: Negative for activity change, appetite change, chills and fever.   HENT: Negative for sore throat and trouble swallowing.    Eyes: Negative for visual disturbance.   Respiratory: Negative for cough and shortness of breath.    Cardiovascular: Negative for chest pain and palpitations.   Gastrointestinal: Positive for anal bleeding. Negative for abdominal distention, abdominal pain, blood in stool, constipation, diarrhea, nausea and vomiting.   Endocrine: Negative for cold intolerance and heat intolerance.   Genitourinary: Negative for dysuria.   Musculoskeletal: Negative for joint swelling.   Skin: Negative for color  "change, rash and wound.   Allergic/Immunologic: Negative for immunocompromised state.   Neurological: Negative for dizziness, seizures, weakness and headaches.   Hematological: Negative for adenopathy. Does not bruise/bleed easily.   Psychiatric/Behavioral: Negative for agitation and confusion.         Ht 149.9 cm (59.02\")   Wt 61.7 kg (136 lb)   LMP  (LMP Unknown)   BMI 27.45 kg/m²   Objective   Physical Exam  Constitutional:       Appearance: She is well-developed.   HENT:      Head: Normocephalic and atraumatic.   Eyes:      Conjunctiva/sclera: Conjunctivae normal.      Pupils: Pupils are equal, round, and reactive to light.   Neck:      Thyroid: No thyromegaly.      Vascular: No JVD.      Trachea: No tracheal deviation.   Cardiovascular:      Rate and Rhythm: Normal rate and regular rhythm.      Heart sounds: No murmur heard.    No friction rub. No gallop.   Pulmonary:      Effort: Pulmonary effort is normal.      Breath sounds: Normal breath sounds.   Abdominal:      General: There is no distension.      Palpations: Abdomen is soft. There is no hepatomegaly or splenomegaly.      Tenderness: There is no abdominal tenderness.      Hernia: No hernia is present.   Musculoskeletal:         General: No deformity. Normal range of motion.      Cervical back: Neck supple.   Skin:     General: Skin is warm and dry.   Neurological:      Mental Status: She is alert and oriented to person, place, and time.               Assessment   Diagnoses and all orders for this visit:    1. Bright red blood per rectum (Primary)    Other orders  -     Pramoxine HCl, Perianal, (Proctofoam) 1 % foam; Insert  into the rectum Every 4 (Four) Hours As Needed for Hemorrhoids.  Dispense: 15 g; Refill: 0      Suellen Miller is a 56 y.o. female with painless bright red blood per rectum.  I suspect hemorrhoids.  Bleeding has subsided.  The patient will initiate topical therapy with Proctofoam and follow-up in 2 weeks.  At this time we will defer " colonoscopy.  See how topical therapy works.  BMI is >= 25 and < 30. (Overweight) The following options were offered after discussion: exercise counseling/recommendations and nutrition counseling/recommendations

## 2022-05-31 ENCOUNTER — OFFICE VISIT (OUTPATIENT)
Dept: SURGERY | Facility: CLINIC | Age: 57
End: 2022-05-31

## 2022-05-31 VITALS — HEIGHT: 59 IN | WEIGHT: 136 LBS | BODY MASS INDEX: 27.42 KG/M2

## 2022-05-31 DIAGNOSIS — K62.5 BRIGHT RED BLOOD PER RECTUM: Primary | ICD-10-CM

## 2022-05-31 PROCEDURE — 99212 OFFICE O/P EST SF 10 MIN: CPT | Performed by: SURGERY

## 2022-06-01 NOTE — PROGRESS NOTES
Subjective   Suellen Miller is a 56 y.o. female is being seen for consultation today at the request of Piter Trevino MD    Suellen Miller is a 56 y.o. female With rectal bleeding.  She had painless bright red blood per rectum last week that lasted for 1 to 2 days but has stopped since.  No palpable masses.  No previous episodes.  No hemodynamic instability or need for transfusion.  Since last visit she has been initiated on Proctofoam and bleeding has resolved.  Last colonoscopy was 2 to 3 years ago.      Past Medical History:   Diagnosis Date   • Dysfunctional gallbladder    • Elevated cholesterol    • Hypertension        Family History   Problem Relation Age of Onset   • Cancer Mother    • Heart disease Father    • Diabetes Father    • Cancer Father    • Hypertension Neg Hx        Social History     Socioeconomic History   • Marital status:    Tobacco Use   • Smoking status: Never Smoker   • Smokeless tobacco: Never Used   Vaping Use   • Vaping Use: Never used   Substance and Sexual Activity   • Alcohol use: No   • Drug use: No   • Sexual activity: Never       Past Surgical History:   Procedure Laterality Date   • CHOLECYSTECTOMY N/A 6/7/2019    Procedure: CHOLECYSTECTOMY LAPAROSCOPIC WITH DAVINCI SI ROBOT;  Surgeon: Rolo Sheldon MD;  Location: [facility];  Service: DaVinci   • COLONOSCOPY     • DIAGNOSTIC LAPAROSCOPY         Review of Systems   Constitutional: Negative for activity change, appetite change, chills and fever.   HENT: Negative for sore throat and trouble swallowing.    Eyes: Negative for visual disturbance.   Respiratory: Negative for cough and shortness of breath.    Cardiovascular: Negative for chest pain and palpitations.   Gastrointestinal: Positive for anal bleeding. Negative for abdominal distention, abdominal pain, blood in stool, constipation, diarrhea, nausea and vomiting.   Endocrine: Negative for cold intolerance and heat intolerance.   Genitourinary: Negative for  "dysuria.   Musculoskeletal: Negative for joint swelling.   Skin: Negative for color change, rash and wound.   Allergic/Immunologic: Negative for immunocompromised state.   Neurological: Negative for dizziness, seizures, weakness and headaches.   Hematological: Negative for adenopathy. Does not bruise/bleed easily.   Psychiatric/Behavioral: Negative for agitation and confusion.         Ht 149.9 cm (59.02\")   Wt 61.7 kg (136 lb)   LMP  (LMP Unknown)   BMI 27.45 kg/m²   Objective   Physical Exam  Constitutional:       Appearance: She is well-developed.   HENT:      Head: Normocephalic and atraumatic.   Eyes:      Conjunctiva/sclera: Conjunctivae normal.      Pupils: Pupils are equal, round, and reactive to light.   Neck:      Thyroid: No thyromegaly.      Vascular: No JVD.      Trachea: No tracheal deviation.   Cardiovascular:      Rate and Rhythm: Normal rate and regular rhythm.      Heart sounds: No murmur heard.    No friction rub. No gallop.   Pulmonary:      Effort: Pulmonary effort is normal.      Breath sounds: Normal breath sounds.   Abdominal:      General: There is no distension.      Palpations: Abdomen is soft. There is no hepatomegaly or splenomegaly.      Tenderness: There is no abdominal tenderness.      Hernia: No hernia is present.   Musculoskeletal:         General: No deformity. Normal range of motion.      Cervical back: Neck supple.   Skin:     General: Skin is warm and dry.   Neurological:      Mental Status: She is alert and oriented to person, place, and time.               Assessment   Diagnoses and all orders for this visit:    1. Bright red blood per rectum (Primary)      Suellen Miller is a 56 y.o. female with painless bright red blood per rectum.  I suspect hemorrhoids.  Bleeding has subsided.  Bleeding has resolved with topical therapy and she is doing well.  Follow-up as needed. BMI is >= 25 and < 30. (Overweight) The following options were offered after discussion: exercise " counseling/recommendations and nutrition counseling/recommendations

## 2022-10-14 ENCOUNTER — APPOINTMENT (OUTPATIENT)
Dept: WOMENS IMAGING | Facility: HOSPITAL | Age: 57
End: 2022-10-14

## 2022-10-14 PROCEDURE — 77063 BREAST TOMOSYNTHESIS BI: CPT | Performed by: RADIOLOGY

## 2022-10-14 PROCEDURE — 77067 SCR MAMMO BI INCL CAD: CPT | Performed by: RADIOLOGY

## 2022-12-12 ENCOUNTER — OFFICE VISIT (OUTPATIENT)
Dept: ENDOCRINOLOGY | Facility: CLINIC | Age: 57
End: 2022-12-12

## 2022-12-12 VITALS
WEIGHT: 140 LBS | RESPIRATION RATE: 16 BRPM | BODY MASS INDEX: 28.22 KG/M2 | OXYGEN SATURATION: 98 % | DIASTOLIC BLOOD PRESSURE: 84 MMHG | HEIGHT: 59 IN | SYSTOLIC BLOOD PRESSURE: 138 MMHG | HEART RATE: 72 BPM

## 2022-12-12 DIAGNOSIS — E04.2 MULTIPLE THYROID NODULES: Primary | ICD-10-CM

## 2022-12-12 PROCEDURE — 99203 OFFICE O/P NEW LOW 30 MIN: CPT | Performed by: INTERNAL MEDICINE

## 2022-12-12 PROCEDURE — 10005 FNA BX W/US GDN 1ST LES: CPT | Performed by: INTERNAL MEDICINE

## 2022-12-12 RX ORDER — MULTIPLE VITAMINS W/ MINERALS TAB 9MG-400MCG
1 TAB ORAL DAILY
COMMUNITY

## 2022-12-12 RX ORDER — SACCHAROMYCES BOULARDII 250 MG
250 CAPSULE ORAL 2 TIMES DAILY
COMMUNITY

## 2022-12-12 NOTE — PROGRESS NOTES
"     Office Note      Date: 2022  Patient Name: Suellen Miller  MRN: [id number]  : 1965    Chief Complaint   Patient presents with   • Establish Care     Thyroid Nodule       History of Present Illness:   Suellen Miller is a 57 y.o. female who presents for Establish Care (Thyroid Nodule)    She denies any prior h/o thyroid disease.  She denies any hypo- or hyperthyroidism.  She hasn't taken any thyroid meds.  She was noted to have goiter on routine exam this fall.  She hasn't noted any change in the size of her neck.  She denies any compressive sxs.  She denies any sxs of hypo- or hyperthyroidism at this time.  She reports thyroid labs were normal.  She had neck u/s done 22.  This showed mutiple thyroid nodules/cysts.  The largest was right inferior pole nodule that was predominantly cystic and was 3.2cm in size.      Subjective      Patient was born where: [place].  Facial radiation exposure: No.  High iodine intake: No  Family hx of thyroid disease: No.    Review of Systems:   Review of Systems   Constitutional: Negative.    HENT: Negative.    Eyes: Negative.    Respiratory: Negative.    Cardiovascular: Negative.    Gastrointestinal: Negative.    Endocrine: Negative.    Genitourinary: Negative.    Musculoskeletal: Negative.    Skin: Negative.    Allergic/Immunologic: Positive for environmental allergies.   Neurological: Negative.    Hematological: Negative.    Psychiatric/Behavioral: Negative.        The following portions of the patient's history were reviewed and updated as appropriate: allergies, current medications, past family history, past medical history, past social history, past surgical history and problem list.    Objective     Visit Vitals  /84   Pulse 72   Resp 16   Ht 150 cm (59.05\")   Wt 63.5 kg (140 lb)   LMP  (LMP Unknown)   SpO2 98%   BMI 28.23 kg/m²       Physical Exam:  Physical Exam  Constitutional:       Appearance: Normal appearance.   HENT:      Head: Normocephalic. "   Eyes:      Extraocular Movements: Extraocular movements intact.      Conjunctiva/sclera: Conjunctivae normal.      Pupils: Pupils are equal, round, and reactive to light.   Neck:      Thyroid: Thyroid mass present. No thyroid tenderness.      Comments: 3cm nodule low in right lobe - freely movable  Cardiovascular:      Rate and Rhythm: Normal rate and regular rhythm.      Pulses: Normal pulses.      Heart sounds: Normal heart sounds.   Pulmonary:      Effort: Pulmonary effort is normal.      Breath sounds: Normal breath sounds.   Abdominal:      General: Bowel sounds are normal.      Palpations: Abdomen is soft.   Musculoskeletal:         General: Normal range of motion.      Cervical back: Normal range of motion.   Lymphadenopathy:      Cervical: No cervical adenopathy.   Skin:     General: Skin is warm and dry.   Neurological:      General: No focal deficit present.      Mental Status: She is alert.   Psychiatric:         Mood and Affect: Mood normal.         Behavior: Behavior normal.         Thought Content: Thought content normal.         Judgment: Judgment normal.         Labs:    TSH  No results found for: TSHBASE     Free T4  No results found for: FREET4    T3  No results found for: A5QTCDM      TPO  No results found for: THYROIDAB    TG AB  No results found for: THGAB    TG  No results found for: THYROGLB    CBC w/DIFF  Lab Results   Component Value Date    WBC 5.38 05/30/2019    RBC 5.14 05/30/2019    HGB 14.9 05/30/2019    HCT 46.3 05/30/2019    MCV 90.1 05/30/2019    MCH 29.0 05/30/2019    MCHC 32.2 05/30/2019    RDW 13.2 05/30/2019    RDWSD 43.0 05/30/2019    MPV 11.3 05/30/2019     05/30/2019    NEUTRORELPCT 59.3 05/30/2019    LYMPHORELPCT 30.3 05/30/2019    MONORELPCT 8.6 05/30/2019    EOSRELPCT 1.1 05/30/2019    BASORELPCT 0.7 05/30/2019    AUTOIGPER 0.0 05/30/2019    NEUTROABS 3.19 05/30/2019    LYMPHSABS 1.63 05/30/2019    MONOSABS 0.46 05/30/2019    EOSABS 0.06 05/30/2019    BASOSABS 0.04  05/30/2019    AUTOIGNUM 0.00 05/30/2019           Assessment / Plan      Assessment & Plan:  Diagnoses and all orders for this visit:    1. Multiple thyroid nodules (Primary)  Assessment & Plan:  She has multiple thyroid nodules.  The largest is predominantly cystic.  We discussed the diagnosis and low likelihood of malignancy.  We discussed options including observation vs FNA vs surgery.  She agreed to FNA of the dominant cyst.      Informed consent was obtained.  The neck was prepped with alcohol.  The u/s was used for guidance.  Four passes were made into the dominant right nodule.  She tolerated the procedure well without obvious complication.  About 6mL of dark brown fluid was aspirated in total.    Will contact her with results when available.    Orders:  -     US Fine Needle Aspiration BX 1st Lesion; Future       Return in about 6 months (around 6/12/2023) for Recheck with TSH.    Dino Rivera MD   12/12/2022

## 2022-12-12 NOTE — ASSESSMENT & PLAN NOTE
She has multiple thyroid nodules.  The largest is predominantly cystic.  We discussed the diagnosis and low likelihood of malignancy.  We discussed options including observation vs FNA vs surgery.  She agreed to FNA of the dominant cyst.      Informed consent was obtained.  The neck was prepped with alcohol.  The u/s was used for guidance.  Four passes were made into the dominant right nodule.  She tolerated the procedure well without obvious complication.  About 6mL of dark brown fluid was aspirated in total.    Will contact her with results when available.

## 2022-12-13 LAB — REF LAB TEST METHOD: NORMAL

## 2023-02-14 ENCOUNTER — OFFICE VISIT (OUTPATIENT)
Dept: SURGERY | Facility: CLINIC | Age: 58
End: 2023-02-14
Payer: COMMERCIAL

## 2023-02-14 VITALS — WEIGHT: 140 LBS | BODY MASS INDEX: 28.22 KG/M2 | HEIGHT: 59 IN

## 2023-02-14 DIAGNOSIS — R10.10 PAIN OF UPPER ABDOMEN: Primary | ICD-10-CM

## 2023-02-14 PROCEDURE — 99212 OFFICE O/P EST SF 10 MIN: CPT | Performed by: SURGERY

## 2023-02-15 PROBLEM — R10.10 PAIN OF UPPER ABDOMEN: Status: ACTIVE | Noted: 2023-02-15

## 2023-02-15 NOTE — PROGRESS NOTES
Subjective   Suellen Miller is a 57 y.o. female is being seen for consultation today at the request of Piter Trevino MD    Suellen Miller is a 57 y.o. female History of Present Illness  Here for follow-up of rectal bleeding in the past.  No bleeding has occurred at home right upper quadrant pain when she is constipated persist but is manageable.  No new findings or abnormalities are noted.  Examination benign.      Past Medical History:   Diagnosis Date   • Dysfunctional gallbladder    • Elevated cholesterol    • Hypertension    • Multiple thyroid nodules 12/12/2022       Family History   Problem Relation Age of Onset   • Cancer Mother    • Heart disease Father    • Diabetes Father    • Cancer Father    • Hypertension Neg Hx        Social History     Socioeconomic History   • Marital status:    Tobacco Use   • Smoking status: Never   • Smokeless tobacco: Never   Vaping Use   • Vaping Use: Never used   Substance and Sexual Activity   • Alcohol use: No   • Drug use: No   • Sexual activity: Never       Past Surgical History:   Procedure Laterality Date   • CHOLECYSTECTOMY N/A 6/7/2019    Procedure: CHOLECYSTECTOMY LAPAROSCOPIC WITH DAVINCI SI ROBOT;  Surgeon: Rolo Sheldon MD;  Location: [facility];  Service: DaVinci   • COLONOSCOPY     • DIAGNOSTIC LAPAROSCOPY         Review of Systems   Constitutional: Negative for activity change, appetite change, chills and fever.   HENT: Negative for sore throat and trouble swallowing.    Eyes: Negative for visual disturbance.   Respiratory: Negative for cough and shortness of breath.    Cardiovascular: Negative for chest pain and palpitations.   Gastrointestinal: Positive for anal bleeding. Negative for abdominal distention, abdominal pain, blood in stool, constipation, diarrhea, nausea and vomiting.   Endocrine: Negative for cold intolerance and heat intolerance.   Genitourinary: Negative for dysuria.   Musculoskeletal: Negative for joint swelling.   Skin:  "Negative for color change, rash and wound.   Allergic/Immunologic: Negative for immunocompromised state.   Neurological: Negative for dizziness, seizures, weakness and headaches.   Hematological: Negative for adenopathy. Does not bruise/bleed easily.   Psychiatric/Behavioral: Negative for agitation and confusion.         Ht 149.9 cm (59\")   Wt 63.5 kg (140 lb)   LMP  (LMP Unknown)   BMI 28.28 kg/m²   Objective   Physical Exam  Constitutional:       Appearance: She is well-developed.   HENT:      Head: Normocephalic and atraumatic.   Eyes:      Conjunctiva/sclera: Conjunctivae normal.      Pupils: Pupils are equal, round, and reactive to light.   Neck:      Thyroid: No thyromegaly.      Vascular: No JVD.      Trachea: No tracheal deviation.   Cardiovascular:      Rate and Rhythm: Normal rate and regular rhythm.      Heart sounds: No murmur heard.    No friction rub. No gallop.   Pulmonary:      Effort: Pulmonary effort is normal.      Breath sounds: Normal breath sounds.   Abdominal:      General: There is no distension.      Palpations: Abdomen is soft. There is no hepatomegaly or splenomegaly.      Tenderness: There is no abdominal tenderness.      Hernia: No hernia is present.   Musculoskeletal:         General: No deformity. Normal range of motion.      Cervical back: Neck supple.   Skin:     General: Skin is warm and dry.   Neurological:      Mental Status: She is alert and oriented to person, place, and time.               Assessment   Diagnoses and all orders for this visit:    1. Pain of upper abdomen (Primary)      Suellen Miller is a 57 y.o. female with painless bright red blood per rectum that has long since resolved.  Her pain with constipation persists and she is doing well otherwise.  Follow-up annually. BMI is >= 25 and < 30. (Overweight) The following options were offered after discussion: exercise counseling/recommendations and nutrition counseling/recommendations             "

## 2023-06-12 ENCOUNTER — OFFICE VISIT (OUTPATIENT)
Dept: ENDOCRINOLOGY | Facility: CLINIC | Age: 58
End: 2023-06-12
Payer: COMMERCIAL

## 2023-06-12 VITALS
HEART RATE: 73 BPM | HEIGHT: 59 IN | OXYGEN SATURATION: 98 % | BODY MASS INDEX: 27.62 KG/M2 | DIASTOLIC BLOOD PRESSURE: 80 MMHG | SYSTOLIC BLOOD PRESSURE: 136 MMHG | WEIGHT: 137 LBS

## 2023-06-12 DIAGNOSIS — E04.2 MULTIPLE THYROID NODULES: Primary | ICD-10-CM

## 2023-06-12 LAB — TSH SERPL DL<=0.05 MIU/L-ACNC: 0.81 UIU/ML (ref 0.27–4.2)

## 2023-06-12 PROCEDURE — 84443 ASSAY THYROID STIM HORMONE: CPT | Performed by: INTERNAL MEDICINE

## 2023-06-12 PROCEDURE — 99213 OFFICE O/P EST LOW 20 MIN: CPT | Performed by: INTERNAL MEDICINE

## 2023-06-12 NOTE — PROGRESS NOTES
"     Office Note      Date: 2023  Patient Name: Suellen Miller  MRN: [id number]  : 1965    Chief Complaint   Patient presents with    multiple thyroid nodules        History of Present Illness:   Suellen Miller is a 57 y.o. female who presents for multiple thyroid nodules     She denies any history of hypo- or hyperthyroidism. She hasn't taken any thyroid meds. She hasn't noted any change in the size of her neck. She denies any compressive sxs. She denies any sxs of hypo- or hyperthyroidism at this time.     She had neck u/s done 22. This showed mutiple thyroid nodules/cysts. The largest was right inferior pole nodule that was predominantly cystic and was 3.2cm in size. She had FNA done of the larger nodule 2022.  About 6mL of cyst fluid was aspirated but no follicular cells.      Subjective      Review of Systems:   Review of Systems   Constitutional: Negative.    Cardiovascular: Negative.    Gastrointestinal: Negative.    Endocrine: Negative.      The following portions of the patient's history were reviewed and updated as appropriate: allergies, current medications, past family history, past medical history, past social history, past surgical history, and problem list.    Objective     Visit Vitals  /80   Pulse 73   Ht 149.9 cm (59\")   Wt 62.1 kg (137 lb)   LMP  (LMP Unknown)   SpO2 98%   BMI 27.67 kg/m²       Physical Exam:  Physical Exam  Constitutional:       Appearance: Normal appearance.   Neck:      Thyroid: No thyroid mass, thyromegaly or thyroid tenderness.      Comments: Thyroid firm but normal size - no discrete nodule palpable  Lymphadenopathy:      Cervical: No cervical adenopathy.   Neurological:      Mental Status: She is alert.       Labs:    TSH  No results found for: TSHBASE     Free T4  No results found for: FREET4    T3  No results found for: G3RDRBP      TPO  No results found for: THYROIDAB    TG AB  No results found for: THGAB    TG  No results found for: THYROGLB    CBC " w/DIFF  Lab Results   Component Value Date    WBC 5.38 05/30/2019    RBC 5.14 05/30/2019    HGB 14.9 05/30/2019    HCT 46.3 05/30/2019    MCV 90.1 05/30/2019    MCH 29.0 05/30/2019    MCHC 32.2 05/30/2019    RDW 13.2 05/30/2019    RDWSD 43.0 05/30/2019    MPV 11.3 05/30/2019     05/30/2019    NEUTRORELPCT 59.3 05/30/2019    LYMPHORELPCT 30.3 05/30/2019    MONORELPCT 8.6 05/30/2019    EOSRELPCT 1.1 05/30/2019    BASORELPCT 0.7 05/30/2019    AUTOIGPER 0.0 05/30/2019    NEUTROABS 3.19 05/30/2019    LYMPHSABS 1.63 05/30/2019    MONOSABS 0.46 05/30/2019    EOSABS 0.06 05/30/2019    BASOSABS 0.04 05/30/2019    AUTOIGNUM 0.00 05/30/2019           Assessment / Plan      Assessment & Plan:  Diagnoses and all orders for this visit:    1. Multiple thyroid nodules (Primary)  Assessment & Plan:  No discrete thyroid nodule noted on exam today.  Check TSH today.  Will send note about results.  Plan for neck u/s at next visit.    Orders:  -     TSH; Future      Current Outpatient Medications   Medication Instructions    docusate sodium (COLACE) 250 mg, Oral, 2 Times Daily PRN    losartan (COZAAR) 25 mg, Oral, Daily PRN    multivitamin with minerals tablet tablet 1 tablet, Oral, Daily    saccharomyces boulardii (FLORASTOR) 250 mg, Oral, 2 Times Daily      Return in about 6 months (around 12/12/2023) for Recheck with TSH, neck u/s.    Dino Rivera MD   06/12/2023

## 2023-06-12 NOTE — ASSESSMENT & PLAN NOTE
No discrete thyroid nodule noted on exam today.  Check TSH today.  Will send note about results.  Plan for neck u/s at next visit.

## 2023-12-01 ENCOUNTER — HOSPITAL ENCOUNTER (EMERGENCY)
Facility: HOSPITAL | Age: 58
Discharge: HOME | End: 2023-12-01
Attending: STUDENT IN AN ORGANIZED HEALTH CARE EDUCATION/TRAINING PROGRAM
Payer: COMMERCIAL

## 2023-12-01 ENCOUNTER — APPOINTMENT (OUTPATIENT)
Dept: GENERAL RADIOLOGY | Facility: HOSPITAL | Age: 58
End: 2023-12-01
Payer: COMMERCIAL

## 2023-12-01 VITALS
WEIGHT: 140 LBS | BODY MASS INDEX: 28.22 KG/M2 | DIASTOLIC BLOOD PRESSURE: 89 MMHG | HEIGHT: 59 IN | SYSTOLIC BLOOD PRESSURE: 144 MMHG | OXYGEN SATURATION: 95 % | HEART RATE: 79 BPM | RESPIRATION RATE: 16 BRPM | TEMPERATURE: 97.9 F

## 2023-12-01 DIAGNOSIS — R07.9 CHEST PAIN, UNSPECIFIED TYPE: Primary | ICD-10-CM

## 2023-12-01 LAB
ALBUMIN SERPL-MCNC: 4.8 G/DL (ref 3.5–5.2)
ALBUMIN/GLOB SERPL: 1.7 G/DL
ALP SERPL-CCNC: 102 U/L (ref 39–117)
ALT SERPL W P-5'-P-CCNC: 44 U/L (ref 1–33)
ANION GAP SERPL CALCULATED.3IONS-SCNC: 14 MMOL/L (ref 5–15)
AST SERPL-CCNC: 26 U/L (ref 1–32)
BASOPHILS # BLD AUTO: 0.05 10*3/MM3 (ref 0–0.2)
BASOPHILS NFR BLD AUTO: 0.6 % (ref 0–1.5)
BILIRUB SERPL-MCNC: 0.3 MG/DL (ref 0–1.2)
BUN SERPL-MCNC: 10 MG/DL (ref 8–23)
BUN/CREAT SERPL: 10.6 (ref 7–25)
CALCIUM SPEC-SCNC: 9.6 MG/DL (ref 8.6–10.5)
CHLORIDE SERPL-SCNC: 102 MMOL/L (ref 98–107)
CO2 SERPL-SCNC: 21 MMOL/L (ref 22–29)
CREAT SERPL-MCNC: 0.94 MG/DL (ref 0.57–1)
DEPRECATED RDW RBC AUTO: 42 FL (ref 37–54)
EGFRCR SERPLBLD CKD-EPI 2021: 69.6 ML/MIN/1.73
EOSINOPHIL # BLD AUTO: 0.02 10*3/MM3 (ref 0–0.4)
EOSINOPHIL NFR BLD AUTO: 0.3 % (ref 0.3–6.2)
ERYTHROCYTE [DISTWIDTH] IN BLOOD BY AUTOMATED COUNT: 12.7 % (ref 12.3–15.4)
GEN 5 2HR TROPONIN T REFLEX: 12 NG/L
GLOBULIN UR ELPH-MCNC: 2.8 GM/DL
GLUCOSE SERPL-MCNC: 112 MG/DL (ref 65–99)
HCT VFR BLD AUTO: 47 % (ref 34–46.6)
HGB BLD-MCNC: 15.6 G/DL (ref 12–15.9)
HOLD SPECIMEN: NORMAL
IMM GRANULOCYTES # BLD AUTO: 0.03 10*3/MM3 (ref 0–0.05)
IMM GRANULOCYTES NFR BLD AUTO: 0.4 % (ref 0–0.5)
LIPASE SERPL-CCNC: 37 U/L (ref 13–60)
LYMPHOCYTES # BLD AUTO: 1.88 10*3/MM3 (ref 0.7–3.1)
LYMPHOCYTES NFR BLD AUTO: 24.3 % (ref 19.6–45.3)
MCH RBC QN AUTO: 29.9 PG (ref 26.6–33)
MCHC RBC AUTO-ENTMCNC: 33.2 G/DL (ref 31.5–35.7)
MCV RBC AUTO: 90 FL (ref 79–97)
MONOCYTES # BLD AUTO: 0.61 10*3/MM3 (ref 0.1–0.9)
MONOCYTES NFR BLD AUTO: 7.9 % (ref 5–12)
NEUTROPHILS NFR BLD AUTO: 5.16 10*3/MM3 (ref 1.7–7)
NEUTROPHILS NFR BLD AUTO: 66.5 % (ref 42.7–76)
NRBC BLD AUTO-RTO: 0 /100 WBC (ref 0–0.2)
NT-PROBNP SERPL-MCNC: <36 PG/ML (ref 0–900)
PLATELET # BLD AUTO: 357 10*3/MM3 (ref 140–450)
PMV BLD AUTO: 10.3 FL (ref 6–12)
POTASSIUM SERPL-SCNC: 4 MMOL/L (ref 3.5–5.2)
PROT SERPL-MCNC: 7.6 G/DL (ref 6–8.5)
RBC # BLD AUTO: 5.22 10*6/MM3 (ref 3.77–5.28)
SODIUM SERPL-SCNC: 137 MMOL/L (ref 136–145)
TROPONIN T DELTA: NORMAL
TROPONIN T SERPL HS-MCNC: <6 NG/L
TROPONIN T SERPL HS-MCNC: <6 NG/L
WBC NRBC COR # BLD AUTO: 7.75 10*3/MM3 (ref 3.4–10.8)
WHOLE BLOOD HOLD COAG: NORMAL
WHOLE BLOOD HOLD SPECIMEN: NORMAL

## 2023-12-01 PROCEDURE — 93005 ELECTROCARDIOGRAM TRACING: CPT

## 2023-12-01 PROCEDURE — 85025 COMPLETE CBC W/AUTO DIFF WBC: CPT | Performed by: STUDENT IN AN ORGANIZED HEALTH CARE EDUCATION/TRAINING PROGRAM

## 2023-12-01 PROCEDURE — 84484 ASSAY OF TROPONIN QUANT: CPT | Performed by: STUDENT IN AN ORGANIZED HEALTH CARE EDUCATION/TRAINING PROGRAM

## 2023-12-01 PROCEDURE — 83880 ASSAY OF NATRIURETIC PEPTIDE: CPT | Performed by: STUDENT IN AN ORGANIZED HEALTH CARE EDUCATION/TRAINING PROGRAM

## 2023-12-01 PROCEDURE — 80053 COMPREHEN METABOLIC PANEL: CPT | Performed by: STUDENT IN AN ORGANIZED HEALTH CARE EDUCATION/TRAINING PROGRAM

## 2023-12-01 PROCEDURE — 83690 ASSAY OF LIPASE: CPT | Performed by: STUDENT IN AN ORGANIZED HEALTH CARE EDUCATION/TRAINING PROGRAM

## 2023-12-01 PROCEDURE — 93005 ELECTROCARDIOGRAM TRACING: CPT | Performed by: STUDENT IN AN ORGANIZED HEALTH CARE EDUCATION/TRAINING PROGRAM

## 2023-12-01 PROCEDURE — 84484 ASSAY OF TROPONIN QUANT: CPT | Performed by: PHYSICIAN ASSISTANT

## 2023-12-01 PROCEDURE — 99284 EMERGENCY DEPT VISIT MOD MDM: CPT

## 2023-12-01 PROCEDURE — 71045 X-RAY EXAM CHEST 1 VIEW: CPT

## 2023-12-01 PROCEDURE — 36415 COLL VENOUS BLD VENIPUNCTURE: CPT

## 2023-12-01 RX ORDER — NITROGLYCERIN 0.4 MG/1
0.4 TABLET SUBLINGUAL
Qty: 100 TABLET | Refills: 0 | Status: SHIPPED | OUTPATIENT
Start: 2023-12-01

## 2023-12-01 RX ORDER — SODIUM CHLORIDE 0.9 % (FLUSH) 0.9 %
10 SYRINGE (ML) INJECTION AS NEEDED
Status: DISCONTINUED | OUTPATIENT
Start: 2023-12-01 | End: 2023-12-01 | Stop reason: HOSPADM

## 2023-12-01 RX ORDER — ASPIRIN 81 MG/1
324 TABLET, CHEWABLE ORAL ONCE
Status: COMPLETED | OUTPATIENT
Start: 2023-12-01 | End: 2023-12-01

## 2023-12-01 RX ADMIN — ASPIRIN 324 MG: 81 TABLET, CHEWABLE ORAL at 15:20

## 2023-12-01 NOTE — Clinical Note
[facility] EMERGENCY DEPARTMENT  [address]  [facility] [address]  Phone: [phone]    Suellen Miller was seen and treated in our emergency department on 12/1/2023.  She may return to work on 12/04/2023.         Thank you for choosing [facility].    Daniel García PA

## 2023-12-01 NOTE — ED PROVIDER NOTES
Subjective   History of Present Illness  58-year-old female presents emergency department today with some chest pain.  Patient reports she is never had chest pain before went to go see Dr. 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  Without her having any type of pain he did set her up for an outpatient stress test on Tuesday.  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  Pain went away on its own.  She was not exerting herself when the pain occurred.  She does not have hyperlipidemia but does have hypertension.    History provided by:  Patient and relative   used: No    Chest Pain  Pain location:  L chest  Pain quality: dull and pressure    Pain radiates to:  Does not radiate  Pain severity:  Mild  Onset quality:  Sudden  Duration:  1 hour  Timing:  Constant  Progression:  Resolved  Chronicity:  New  Context: not breathing, not eating, not intercourse, not movement, not raising an arm, not at rest and not trauma    Relieved by:  Nothing  Worsened by:  Nothing  Ineffective treatments:  None tried  Associated symptoms: no abdominal pain, no anorexia, no anxiety, no claudication, no cough, no diaphoresis, no fever, no headache, no nausea, no near-syncope, no orthopnea, no shortness of breath and no weakness    Risk factors: hypertension    Risk factors: no coronary artery disease, no diabetes mellitus, no high cholesterol, not male, not obese, no prior DVT/PE, no smoking and no surgery        Review of Systems   Constitutional:  Negative for diaphoresis and fever.   Respiratory:  Negative for cough and shortness of breath.    Cardiovascular:  Positive for chest pain. Negative for orthopnea, claudication and near-syncope.   Gastrointestinal:  Negative for abdominal pain, anorexia and nausea.   Neurological:  Negative for weakness and headaches.   All other  systems reviewed and are negative.      Past Medical History:   Diagnosis Date    Dysfunctional gallbladder     Elevated cholesterol     Hypertension     Multiple thyroid nodules 12/12/2022       Allergies   Allergen Reactions    Lisinopril Anaphylaxis    Penicillins Hives    Ciprofloxacin Rash       Past Surgical History:   Procedure Laterality Date    CHOLECYSTECTOMY N/A 6/7/2019    Procedure: CHOLECYSTECTOMY LAPAROSCOPIC WITH DAVINCI SI ROBOT;  Surgeon: Rolo Sheldon MD;  Location: [facility];  Service: DaVinci    COLONOSCOPY      DIAGNOSTIC LAPAROSCOPY         Family History   Problem Relation Age of Onset    Cancer Mother     Heart disease Father     Diabetes Father     Cancer Father     Hypertension Neg Hx        Social History     Socioeconomic History    Marital status:    Tobacco Use    Smoking status: Never    Smokeless tobacco: Never   Vaping Use    Vaping Use: Never used   Substance and Sexual Activity    Alcohol use: No    Drug use: No    Sexual activity: Never           Objective   Physical Exam  Vitals and nursing note reviewed.   Constitutional:       General: She is not in acute distress.     Appearance: She is well-developed. She is not diaphoretic.      Comments: Warm pink dry afebrile nontoxic   HENT:      Head: Normocephalic and atraumatic.      Nose: Nose normal.   Eyes:      General: No scleral icterus.     Conjunctiva/sclera: Conjunctivae normal.   Cardiovascular:      Rate and Rhythm: Normal rate and regular rhythm.      Heart sounds: Normal heart sounds. No murmur heard.  Pulmonary:      Effort: Pulmonary effort is normal. No respiratory distress.      Breath sounds: Normal breath sounds. No decreased breath sounds, wheezing, rhonchi or rales.   Abdominal:      General: Bowel sounds are normal.      Palpations: Abdomen is soft.      Tenderness: There is no abdominal tenderness.   Musculoskeletal:         General: Normal range of motion.      Cervical back: Normal range of  motion and neck supple.   Skin:     General: Skin is warm and dry.   Neurological:      Mental Status: She is alert and oriented to person, place, and time.   Psychiatric:         Behavior: Behavior normal.         Procedures           ED Course                HEART Score: 2                  Recent Results (from the past 24 hour(s))   ECG 12 Lead ED Triage Standing Order; Chest Pain    Collection Time: 12/01/23  2:42 PM   Result Value Ref Range    QT Interval 352 ms    QTC Interval 432 ms   High Sensitivity Troponin T    Collection Time: 12/01/23  3:20 PM    Specimen: Blood   Result Value Ref Range    HS Troponin T <6 <14 ng/L   Comprehensive Metabolic Panel    Collection Time: 12/01/23  3:20 PM    Specimen: Blood   Result Value Ref Range    Glucose 112 (H) 65 - 99 mg/dL    BUN 10 8 - 23 mg/dL    Creatinine 0.94 0.57 - 1.00 mg/dL    Sodium 137 136 - 145 mmol/L    Potassium 4.0 3.5 - 5.2 mmol/L    Chloride 102 98 - 107 mmol/L    CO2 21.0 (L) 22.0 - 29.0 mmol/L    Calcium 9.6 8.6 - 10.5 mg/dL    Total Protein 7.6 6.0 - 8.5 g/dL    Albumin 4.8 3.5 - 5.2 g/dL    ALT (SGPT) 44 (H) 1 - 33 U/L    AST (SGOT) 26 1 - 32 U/L    Alkaline Phosphatase 102 39 - 117 U/L    Total Bilirubin 0.3 0.0 - 1.2 mg/dL    Globulin 2.8 gm/dL    A/G Ratio 1.7 g/dL    BUN/Creatinine Ratio 10.6 7.0 - 25.0    Anion Gap 14.0 5.0 - 15.0 mmol/L    eGFR 69.6 >60.0 mL/min/1.73   Lipase    Collection Time: 12/01/23  3:20 PM    Specimen: Blood   Result Value Ref Range    Lipase 37 13 - 60 U/L   BNP    Collection Time: 12/01/23  3:20 PM    Specimen: Blood   Result Value Ref Range    proBNP <36.0 0.0 - 900.0 pg/mL   Green Top (Gel)    Collection Time: 12/01/23  3:20 PM   Result Value Ref Range    Extra Tube Hold for add-ons.    Lavender Top    Collection Time: 12/01/23  3:20 PM   Result Value Ref Range    Extra Tube hold for add-on    Gold Top - SST    Collection Time: 12/01/23  3:20 PM   Result Value Ref Range    Extra Tube Hold for add-ons.    Gray Top     Collection Time: 12/01/23  3:20 PM   Result Value Ref Range    Extra Tube Hold for add-ons.    Light Blue Top    Collection Time: 12/01/23  3:20 PM   Result Value Ref Range    Extra Tube Hold for add-ons.    CBC Auto Differential    Collection Time: 12/01/23  3:20 PM    Specimen: Blood   Result Value Ref Range    WBC 7.75 3.40 - 10.80 10*3/mm3    RBC 5.22 3.77 - 5.28 10*6/mm3    Hemoglobin 15.6 12.0 - 15.9 g/dL    Hematocrit 47.0 (H) 34.0 - 46.6 %    MCV 90.0 79.0 - 97.0 fL    MCH 29.9 26.6 - 33.0 pg    MCHC 33.2 31.5 - 35.7 g/dL    RDW 12.7 12.3 - 15.4 %    RDW-SD 42.0 37.0 - 54.0 fl    MPV 10.3 6.0 - 12.0 fL    Platelets 357 140 - 450 10*3/mm3    Neutrophil % 66.5 42.7 - 76.0 %    Lymphocyte % 24.3 19.6 - 45.3 %    Monocyte % 7.9 5.0 - 12.0 %    Eosinophil % 0.3 0.3 - 6.2 %    Basophil % 0.6 0.0 - 1.5 %    Immature Grans % 0.4 0.0 - 0.5 %    Neutrophils, Absolute 5.16 1.70 - 7.00 10*3/mm3    Lymphocytes, Absolute 1.88 0.70 - 3.10 10*3/mm3    Monocytes, Absolute 0.61 0.10 - 0.90 10*3/mm3    Eosinophils, Absolute 0.02 0.00 - 0.40 10*3/mm3    Basophils, Absolute 0.05 0.00 - 0.20 10*3/mm3    Immature Grans, Absolute 0.03 0.00 - 0.05 10*3/mm3    nRBC 0.0 0.0 - 0.2 /100 WBC   High Sensitivity Troponin T 2Hr    Collection Time: 12/01/23  5:31 PM    Specimen: Blood   Result Value Ref Range    HS Troponin T 12 <14 ng/L    Troponin T Delta     ECG 12 Lead ED Triage Standing Order; Chest Pain    Collection Time: 12/01/23  5:37 PM   Result Value Ref Range    QT Interval 392 ms    QTC Interval 446 ms   Single High Sensitivity Troponin T    Collection Time: 12/01/23  7:06 PM    Specimen: Blood   Result Value Ref Range    HS Troponin T <6 <14 ng/L     Note: In addition to lab results from this visit, the labs listed above may include labs taken at another facility or during a different encounter within the last 24 hours. Please correlate lab times with ED admission and discharge times for further clarification of the services  performed during this visit.    XR Chest 1 View   Final Result   Impression:   No active cardiopulmonary disease         Electronically Signed: Yoel Woodson     12/1/2023 4:27 PM EST     Workstation ID: [id number]        Vitals:    12/01/23 1830 12/01/23 1902 12/01/23 1907 12/01/23 1912   BP: 126/76 144/89     BP Location:       Patient Position:       Pulse: 78 83 85 79   Resp:       Temp:       TempSrc:       SpO2: 95% 94% 95% 95%   Weight:       Height:         Medications   sodium chloride 0.9 % flush 10 mL (has no administration in time range)   aspirin chewable tablet 324 mg (324 mg Oral Given 12/1/23 1520)     ECG/EMG Results (last 24 hours)       Procedure Component Value Units Date/Time    ECG 12 Lead ED Triage Standing Order; Chest Pain [[id number]] Collected: 12/01/23 1442     Updated: 12/01/23 1447     QT Interval 352 ms      QTC Interval 432 ms     Narrative:      Test Reason : ED Triage Standing Order~  Blood Pressure :   */*   mmHG  Vent. Rate :  91 BPM     Atrial Rate :  91 BPM     P-R Int : 136 ms          QRS Dur :  70 ms      QT Int : 352 ms       P-R-T Axes :  53 -27  58 degrees     QTc Int : 432 ms    Normal sinus rhythm  Normal ECG  No previous ECGs available    Referred By:            Confirmed By:     ECG 12 Lead ED Triage Standing Order; Chest Pain [[id number]] Collected: 12/01/23 1737     Updated: 12/01/23 1737     QT Interval 392 ms      QTC Interval 446 ms     Narrative:      Test Reason : ED Triage Standing Order~  Blood Pressure :   */*   mmHG  Vent. Rate :  78 BPM     Atrial Rate :  78 BPM     P-R Int : 150 ms          QRS Dur :  68 ms      QT Int : 392 ms       P-R-T Axes :  47 -27  26 degrees     QTc Int : 446 ms    Normal sinus rhythm  Normal ECG  When compared with ECG of 01-DEC-2023 14:42, (Unconfirmed)  No significant change was found    Referred By: EDMD           Confirmed By:           ECG 12 Lead ED Triage Standing Order; Chest Pain   Preliminary Result   Test Reason : ED Triage  Standing Order~   Blood Pressure :   */*   mmHG   Vent. Rate :  78 BPM     Atrial Rate :  78 BPM      P-R Int : 150 ms          QRS Dur :  68 ms       QT Int : 392 ms       P-R-T Axes :  47 -27  26 degrees      QTc Int : 446 ms      Normal sinus rhythm   Normal ECG   When compared with ECG of 01-DEC-2023 14:42, (Unconfirmed)   No significant change was found      Referred By: EDMD           Confirmed By:       ECG 12 Lead ED Triage Standing Order; Chest Pain   Preliminary Result   Test Reason : ED Triage Standing Order~   Blood Pressure :   */*   mmHG   Vent. Rate :  91 BPM     Atrial Rate :  91 BPM      P-R Int : 136 ms          QRS Dur :  70 ms       QT Int : 352 ms       P-R-T Axes :  53 -27  58 degrees      QTc Int : 432 ms      Normal sinus rhythm   Normal ECG   No previous ECGs available      Referred By:            Confirmed By:                       Medical Decision Making  58-year-old female that is scheduled for stress test next week.  First troponin was undetectable second was 12 still within normal limits but technically up by more than 4.  A third troponin is good to be drawn.    Problems Addressed:  Chest pain, unspecified type: complicated acute illness or injury    Amount and/or Complexity of Data Reviewed  Labs: ordered. Decision-making details documented in ED Course.  Radiology: ordered. Decision-making details documented in ED Course.  ECG/medicine tests: ordered.    Risk  OTC drugs.  Prescription drug management.        Final diagnoses:   Chest pain, unspecified type       ED Disposition  ED Disposition       ED Disposition   Discharge    Condition   Stable    Comment   --               Orville Thrasher MD  [address] [place] [address] [address]  [facility] [address] [phone]               Medication List        New Prescriptions      nitroglycerin 0.4 MG SL tablet  Commonly known as: NITROSTAT  Place 1 tablet under the tongue Every 5 (Five) Minutes As Needed for Chest Pain. Take no more than  3 doses in 15 minutes.            Stop      multivitamin with minerals tablet tablet     saccharomyces boulardii 250 MG capsule  Commonly known as: FLORASTOR               Where to Get Your Medications        These medications were sent to [facility]/pharmacy #[id number] - [place], [place] - [address] [address] - [phone] [facility] [phone]   [address] [address]      Phone: [phone]   nitroglycerin 0.4 MG SL tablet            Daniel García PA  12/01/23 4806

## 2023-12-02 LAB
QT INTERVAL: 352 MS
QT INTERVAL: 392 MS
QTC INTERVAL: 432 MS
QTC INTERVAL: 446 MS

## 2023-12-12 ENCOUNTER — OFFICE VISIT (OUTPATIENT)
Dept: ENDOCRINOLOGY | Facility: CLINIC | Age: 58
End: 2023-12-12
Payer: COMMERCIAL

## 2023-12-12 VITALS
HEART RATE: 70 BPM | HEIGHT: 59 IN | BODY MASS INDEX: 28.22 KG/M2 | SYSTOLIC BLOOD PRESSURE: 140 MMHG | OXYGEN SATURATION: 98 % | DIASTOLIC BLOOD PRESSURE: 70 MMHG | WEIGHT: 140 LBS

## 2023-12-12 DIAGNOSIS — E04.2 MULTIPLE THYROID NODULES: Primary | ICD-10-CM

## 2023-12-12 LAB — TSH SERPL DL<=0.05 MIU/L-ACNC: 0.78 UIU/ML (ref 0.27–4.2)

## 2023-12-12 PROCEDURE — 84443 ASSAY THYROID STIM HORMONE: CPT | Performed by: INTERNAL MEDICINE

## 2023-12-12 NOTE — ASSESSMENT & PLAN NOTE
Check TSH today.  Will send note about results.    A neck u/s was performed today.  This revealed multiple bilateral thyroid nodules.  The largest was a cyst in the right lower pole.  It measured 2.36cm in size.  There was another nodule in the rightward isthmus.  This was 1cm in size.  The nodules in the left lobe were all <1cm in size.  No abnormal lymph nodes were seen.  Plan for another u/s in a year.    The largest nodule was smaller compared to u/s from 9/2022 before the FNA.  The other nodules were stable.

## 2023-12-12 NOTE — PROGRESS NOTES
"     Office Note      Date: 2023  Patient Name: Suellen Miller  MRN: [id number]  : 1965    Chief Complaint   Patient presents with    Thyroid Problem     nodules       History of Present Illness:   Suellen Miller is a 58 y.o. female who presents for Thyroid Problem (nodules)    She denies any history of hypo- or hyperthyroidism. She hasn't taken any thyroid meds. She hasn't noted any change in the size of her neck. She denies any compressive sxs. She denies any sxs of hypo- or hyperthyroidism at this time.      She had neck u/s done 22. This showed mutiple thyroid nodules/cysts. The largest was right inferior pole nodule that was predominantly cystic and was 3.2cm in size. She had FNA done of the larger nodule 2022.  About 6mL of cyst fluid was aspirated but no follicular cells.     Subjective      Review of Systems:   Review of Systems   Constitutional: Negative.    Cardiovascular: Negative.    Gastrointestinal: Negative.    Endocrine: Negative.        The following portions of the patient's history were reviewed and updated as appropriate: allergies, current medications, past family history, past medical history, past social history, past surgical history, and problem list.    Objective     Visit Vitals  /70 (BP Location: Left arm, Patient Position: Sitting, Cuff Size: Adult)   Pulse 70   Ht 149.9 cm (59\")   Wt 63.5 kg (140 lb)   LMP  (LMP Unknown)   SpO2 98%   BMI 28.28 kg/m²       Physical Exam:  Physical Exam  Constitutional:       Appearance: Normal appearance.   Neurological:      Mental Status: She is alert.         Labs:    TSH  No results found for: \"TSHBASE\"     Free T4  No results found for: \"FREET4\"    T3  No results found for: \"R4VJUDO\"      TPO  No results found for: \"THYROIDAB\"    TG AB  No results found for: \"THGAB\"    TG  No results found for: \"THYROGLB\"    CBC w/DIFF  Lab Results   Component Value Date    WBC 7.75 2023    RBC 5.22 2023    HGB 15.6 2023    HCT " 47.0 (H) 12/01/2023    MCV 90.0 12/01/2023    MCH 29.9 12/01/2023    MCHC 33.2 12/01/2023    RDW 12.7 12/01/2023    RDWSD 42.0 12/01/2023    MPV 10.3 12/01/2023     12/01/2023    NEUTRORELPCT 66.5 12/01/2023    LYMPHORELPCT 24.3 12/01/2023    MONORELPCT 7.9 12/01/2023    EOSRELPCT 0.3 12/01/2023    BASORELPCT 0.6 12/01/2023    AUTOIGPER 0.4 12/01/2023    NEUTROABS 5.16 12/01/2023    LYMPHSABS 1.88 12/01/2023    MONOSABS 0.61 12/01/2023    EOSABS 0.02 12/01/2023    BASOSABS 0.05 12/01/2023    AUTOIGNUM 0.03 12/01/2023    NRBC 0.0 12/01/2023           Assessment / Plan      Assessment & Plan:  Diagnoses and all orders for this visit:    1. Multiple thyroid nodules (Primary)  Assessment & Plan:  Check TSH today.  Will send note about results.    A neck u/s was performed today.  This revealed multiple bilateral thyroid nodules.  The largest was a cyst in the right lower pole.  It measured 2.36cm in size.  There was another nodule in the rightward isthmus.  This was 1cm in size.  The nodules in the left lobe were all <1cm in size.  No abnormal lymph nodes were seen.  Plan for another u/s in a year.    The largest nodule was smaller compared to u/s from 9/2022 before the FNA.  The other nodules were stable.    Orders:  -     TSH; Future  -     US Thyroid      Current Outpatient Medications   Medication Instructions    docusate sodium (COLACE) 250 mg, Oral, 2 Times Daily PRN    losartan (COZAAR) 25 mg, Oral, Daily PRN      Return in about 6 months (around 6/12/2024) for Recheck with TSH.    Dino Rivera MD   12/12/2023

## 2024-06-13 ENCOUNTER — OFFICE VISIT (OUTPATIENT)
Dept: ENDOCRINOLOGY | Facility: CLINIC | Age: 59
End: 2024-06-13
Payer: COMMERCIAL

## 2024-06-13 VITALS
DIASTOLIC BLOOD PRESSURE: 66 MMHG | WEIGHT: 128 LBS | HEIGHT: 59 IN | BODY MASS INDEX: 25.8 KG/M2 | OXYGEN SATURATION: 99 % | SYSTOLIC BLOOD PRESSURE: 128 MMHG | HEART RATE: 74 BPM

## 2024-06-13 DIAGNOSIS — E04.2 MULTIPLE THYROID NODULES: Primary | ICD-10-CM

## 2024-06-13 LAB — TSH SERPL DL<=0.05 MIU/L-ACNC: 0.94 UIU/ML (ref 0.27–4.2)

## 2024-06-13 PROCEDURE — 99213 OFFICE O/P EST LOW 20 MIN: CPT | Performed by: INTERNAL MEDICINE

## 2024-06-13 PROCEDURE — 84443 ASSAY THYROID STIM HORMONE: CPT | Performed by: INTERNAL MEDICINE

## 2024-06-13 NOTE — ASSESSMENT & PLAN NOTE
Neck exam is stable.  Plan for another neck u/s in 6 months.  Check TSH today.  Will send note about results.

## 2024-06-13 NOTE — PROGRESS NOTES
"     Office Note      Date: 2024  Patient Name: Suellen Miller  MRN: [id number]  : 1965    Chief Complaint   Patient presents with    Thyroid Problem     Multiple thyroid nodules         History of Present Illness:   Suellen Miller is a 58 y.o. female who presents for Thyroid Problem (Multiple thyroid nodules/)    She denies any history of hypo- or hyperthyroidism. She hasn't taken any thyroid meds. She hasn't noted any change in the size of her neck. She denies any compressive sxs. She denies any sxs of hypo- or hyperthyroidism at this time.      She had neck u/s done 22. This showed mutiple thyroid nodules/cysts. The largest was right inferior pole nodule that was predominantly cystic and was 3.2cm in size. She had FNA done of the larger nodule 2022.  About 6mL of cyst fluid was aspirated but no follicular cells.  Neck u/s 2023 showed the aspirated cyst was smaller and other nodules were stable in size.    Subjective      Review of Systems:   Review of Systems   Constitutional: Negative.    Cardiovascular: Negative.    Gastrointestinal: Negative.    Endocrine: Negative.        The following portions of the patient's history were reviewed and updated as appropriate: allergies, current medications, past family history, past medical history, past social history, past surgical history, and problem list.    Objective     Visit Vitals  /66 (BP Location: Right arm, Patient Position: Sitting, Cuff Size: Adult)   Pulse 74   Ht 149.9 cm (59\")   Wt 58.1 kg (128 lb)   LMP  (LMP Unknown)   SpO2 99%   BMI 25.85 kg/m²       Physical Exam:  Physical Exam  Constitutional:       Appearance: Normal appearance.   Neck:      Thyroid: No thyroid mass or thyroid tenderness.      Comments: Thyroid firm and upper normal size but no discrete nodules were noted  Lymphadenopathy:      Cervical: No cervical adenopathy.   Neurological:      Mental Status: She is alert.         Labs:    TSH  No results found for: " "\"TSHBASE\"     Free T4  No results found for: \"FREET4\"    T3  No results found for: \"R5TYQKY\"      TPO  No results found for: \"THYROIDAB\"    TG AB  No results found for: \"THGAB\"    TG  No results found for: \"THYROGLB\"    CBC w/DIFF  Lab Results   Component Value Date    WBC 7.75 12/01/2023    RBC 5.22 12/01/2023    HGB 15.6 12/01/2023    HCT 47.0 (H) 12/01/2023    MCV 90.0 12/01/2023    MCH 29.9 12/01/2023    MCHC 33.2 12/01/2023    RDW 12.7 12/01/2023    RDWSD 42.0 12/01/2023    MPV 10.3 12/01/2023     12/01/2023    NEUTRORELPCT 66.5 12/01/2023    LYMPHORELPCT 24.3 12/01/2023    MONORELPCT 7.9 12/01/2023    EOSRELPCT 0.3 12/01/2023    BASORELPCT 0.6 12/01/2023    AUTOIGPER 0.4 12/01/2023    NEUTROABS 5.16 12/01/2023    LYMPHSABS 1.88 12/01/2023    MONOSABS 0.61 12/01/2023    EOSABS 0.02 12/01/2023    BASOSABS 0.05 12/01/2023    AUTOIGNUM 0.03 12/01/2023    NRBC 0.0 12/01/2023           Assessment / Plan      Assessment & Plan:  Diagnoses and all orders for this visit:    1. Multiple thyroid nodules (Primary)  Assessment & Plan:  Neck exam is stable.  Plan for another neck u/s in 6 months.  Check TSH today.  Will send note about results.    Orders:  -     TSH; Future      Current Outpatient Medications   Medication Instructions    docusate sodium (COLACE) 250 mg, Oral, 2 Times Daily PRN    losartan (COZAAR) 25 mg, Oral, Daily PRN      Return in about 6 months (around 12/13/2024) for Recheck with TSH, neck u/s.    Electronically signed by: Dino Rivera MD  06/13/2024  "

## 2025-01-03 ENCOUNTER — OFFICE VISIT (OUTPATIENT)
Dept: ENDOCRINOLOGY | Facility: CLINIC | Age: 60
End: 2025-01-03
Payer: COMMERCIAL

## 2025-01-03 VITALS
DIASTOLIC BLOOD PRESSURE: 86 MMHG | HEIGHT: 59 IN | SYSTOLIC BLOOD PRESSURE: 132 MMHG | BODY MASS INDEX: 26.33 KG/M2 | OXYGEN SATURATION: 96 % | WEIGHT: 130.6 LBS | HEART RATE: 64 BPM

## 2025-01-03 DIAGNOSIS — E04.2 MULTIPLE THYROID NODULES: Primary | ICD-10-CM

## 2025-01-03 LAB — TSH SERPL DL<=0.05 MIU/L-ACNC: 0.66 UIU/ML (ref 0.27–4.2)

## 2025-01-03 PROCEDURE — 84443 ASSAY THYROID STIM HORMONE: CPT | Performed by: INTERNAL MEDICINE

## 2025-01-03 PROCEDURE — 99213 OFFICE O/P EST LOW 20 MIN: CPT | Performed by: INTERNAL MEDICINE

## 2025-01-03 PROCEDURE — 76536 US EXAM OF HEAD AND NECK: CPT | Performed by: INTERNAL MEDICINE

## 2025-01-03 NOTE — ASSESSMENT & PLAN NOTE
Check TSH today.    A neck u/s was performed today.  This revealed a mixed cystic nodule in the right thyroid lobe that was 1.98cm.  There was a nodule in the rightward isthmus that measured 1.1cm.  There were multiple nodules/cysts in the left lobe.  The largest was 1.22cm.  No abnormal lymph nodes were seen.  This appears stable compared to u/s from 12/2023. The largest nodule may be a bit smaller from 12/2023.    Plan for another u/s in 2 years.

## 2025-01-03 NOTE — PROGRESS NOTES
"     Office Note      Date: 2025  Patient Name: Suellen Miller  MRN: [id number]  : 1965    Chief Complaint   Patient presents with    Thyroid Problem     Multiple Thyroid Nodules  Thyroid Ultrasound       History of Present Illness:   Suellen Miller is a 59 y.o. female who presents for Thyroid Problem (Multiple Thyroid Nodules/Thyroid Ultrasound)    She denies any history of hypo- or hyperthyroidism. She hasn't taken any thyroid meds. She hasn't noted any change in the size of her neck. She denies any compressive sxs. She denies any sxs of hypo- or hyperthyroidism at this time.      She had neck u/s done 22. This showed mutiple thyroid nodules/cysts. The largest was right inferior pole nodule that was predominantly cystic and was 3.2cm in size. She had FNA done of the larger nodule 2022.  About 6mL of cyst fluid was aspirated but no follicular cells.  Neck u/s 2023 showed the aspirated cyst was smaller and other nodules were stable in size.    Subjective      Review of Systems:   Review of Systems   Constitutional: Negative.    Cardiovascular: Negative.    Gastrointestinal: Negative.    Endocrine: Negative.        The following portions of the patient's history were reviewed and updated as appropriate: allergies, current medications, past family history, past medical history, past social history, past surgical history, and problem list.    Objective     Visit Vitals  /86 (BP Location: Left arm, Patient Position: Sitting, Cuff Size: Adult)   Pulse 64   Ht 149.9 cm (59.02\")   Wt 59.2 kg (130 lb 9.6 oz)   LMP  (LMP Unknown)   SpO2 96%   BMI 26.36 kg/m²       Physical Exam:  Physical Exam  Constitutional:       Appearance: Normal appearance.   Neurological:      Mental Status: She is alert.         Labs:    TSH  No results found for: \"TSHBASE\"     Free T4  No results found for: \"FREET4\"    T3  No results found for: \"X5XAXAW\"      TPO  No results found for: \"THYROIDAB\"    TG AB  No results found " "for: \"THGAB\"    TG  No results found for: \"THYROGLB\"    CBC w/DIFF  Lab Results   Component Value Date    WBC 7.75 12/01/2023    RBC 5.22 12/01/2023    HGB 15.6 12/01/2023    HCT 47.0 (H) 12/01/2023    MCV 90.0 12/01/2023    MCH 29.9 12/01/2023    MCHC 33.2 12/01/2023    RDW 12.7 12/01/2023    RDWSD 42.0 12/01/2023    MPV 10.3 12/01/2023     12/01/2023    NEUTRORELPCT 66.5 12/01/2023    LYMPHORELPCT 24.3 12/01/2023    MONORELPCT 7.9 12/01/2023    EOSRELPCT 0.3 12/01/2023    BASORELPCT 0.6 12/01/2023    AUTOIGPER 0.4 12/01/2023    NEUTROABS 5.16 12/01/2023    LYMPHSABS 1.88 12/01/2023    MONOSABS 0.61 12/01/2023    EOSABS 0.02 12/01/2023    BASOSABS 0.05 12/01/2023    AUTOIGNUM 0.03 12/01/2023    NRBC 0.0 12/01/2023           Assessment / Plan      Assessment & Plan:  Diagnoses and all orders for this visit:    1. Multiple thyroid nodules (Primary)  Assessment & Plan:  Check TSH today.    A neck u/s was performed today.  This revealed a mixed cystic nodule in the right thyroid lobe that was 1.98cm.  There was a nodule in the rightward isthmus that measured 1.1cm.  There were multiple nodules/cysts in the left lobe.  The largest was 1.22cm.  No abnormal lymph nodes were seen.  This appears stable compared to u/s from 12/2023. The largest nodule may be a bit smaller from 12/2023.    Plan for another u/s in 2 years.    Orders:  -     TSH; Future  -     US Thyroid      Current Outpatient Medications   Medication Instructions    docusate sodium (COLACE) 250 mg, Oral, 2 Times Daily PRN    losartan (COZAAR) 25 mg, Daily PRN      Return in about 1 year (around 1/3/2026) for Recheck with TSH.    Electronically signed by: Dino Rivera MD  01/03/2025  "

## 2025-07-23 ENCOUNTER — TELEPHONE (OUTPATIENT)
Dept: ENDOCRINOLOGY | Facility: CLINIC | Age: 60
End: 2025-07-23

## 2025-07-28 ENCOUNTER — OFFICE VISIT (OUTPATIENT)
Dept: ENDOCRINOLOGY | Facility: CLINIC | Age: 60
End: 2025-07-28
Payer: COMMERCIAL

## 2025-07-28 VITALS
OXYGEN SATURATION: 98 % | HEART RATE: 58 BPM | DIASTOLIC BLOOD PRESSURE: 82 MMHG | WEIGHT: 130.4 LBS | BODY MASS INDEX: 26.29 KG/M2 | HEIGHT: 59 IN | SYSTOLIC BLOOD PRESSURE: 124 MMHG

## 2025-07-28 DIAGNOSIS — E04.2 MULTIPLE THYROID NODULES: Primary | ICD-10-CM

## 2025-07-28 DIAGNOSIS — R13.10 DYSPHAGIA, UNSPECIFIED TYPE: ICD-10-CM

## 2025-07-28 PROCEDURE — 99214 OFFICE O/P EST MOD 30 MIN: CPT | Performed by: INTERNAL MEDICINE

## 2025-07-28 NOTE — PROGRESS NOTES
"     Office Note      Date: 2025  Patient Name: Suellen Miller  MRN: [id number]  : 1965    Chief Complaint   Patient presents with    Thyroid Problem     Multiple Thyroid Nodules       History of Present Illness:   Suellen Miller is a 59 y.o. female who presents for Thyroid Problem (Multiple Thyroid Nodules)    She denies any history of hypo- or hyperthyroidism. She hasn't taken any thyroid meds. She hasn't noted any change in the size of her neck. She has noted some more trouble swallowing the last 3 weeks. She denies any sxs of hypo- or hyperthyroidism at this time.      She had neck u/s done 22. This showed mutiple thyroid nodules/cysts. The largest was right inferior pole nodule that was predominantly cystic and was 3.2cm in size. She had FNA done of the larger nodule 2022.  About 6mL of cyst fluid was aspirated but no follicular cells.  Neck u/s 2023 showed the aspirated cyst was smaller and other nodules were stable in size.  Neck u/s 2025 was stable/smaller.      She had neck u/s done earlier this week.  This showed normal size thyroid gland.  The largest nodule in the right lobe was smaller.  The largest on the left was stable in size.      Subjective      Review of Systems:   Review of Systems   Constitutional: Negative.    Cardiovascular: Negative.    Gastrointestinal: Negative.    Endocrine: Negative.        The following portions of the patient's history were reviewed and updated as appropriate: allergies, current medications, past family history, past medical history, past social history, past surgical history, and problem list.    Objective     Visit Vitals  /82 (BP Location: Left arm, Patient Position: Sitting, Cuff Size: Adult)   Pulse 58   Ht 149.9 cm (59.02\")   Wt 59.1 kg (130 lb 6.4 oz)   LMP  (LMP Unknown)   SpO2 98%   BMI 26.32 kg/m²       Physical Exam:  Physical Exam  Constitutional:       Appearance: Normal appearance.   Neck:      Thyroid: Thyromegaly present. No " "thyroid mass or thyroid tenderness.      Comments: Thyroid normal size and bosselated  Lymphadenopathy:      Cervical: No cervical adenopathy.   Neurological:      Mental Status: She is alert.         Labs:    TSH  No results found for: \"TSHBASE\"     Free T4  No results found for: \"FREET4\"    T3  No results found for: \"R4OSVWM\"      TPO  No results found for: \"THYROIDAB\"    TG AB  No results found for: \"THGAB\"    TG  No results found for: \"THYROGLB\"    CBC w/DIFF  Lab Results   Component Value Date    WBC 7.75 12/01/2023    RBC 5.22 12/01/2023    HGB 15.6 12/01/2023    HCT 47.0 (H) 12/01/2023    MCV 90.0 12/01/2023    MCH 29.9 12/01/2023    MCHC 33.2 12/01/2023    RDW 12.7 12/01/2023    RDWSD 42.0 12/01/2023    MPV 10.3 12/01/2023     12/01/2023    NEUTRORELPCT 66.5 12/01/2023    LYMPHORELPCT 24.3 12/01/2023    MONORELPCT 7.9 12/01/2023    EOSRELPCT 0.3 12/01/2023    BASORELPCT 0.6 12/01/2023    AUTOIGPER 0.4 12/01/2023    NEUTROABS 5.16 12/01/2023    LYMPHSABS 1.88 12/01/2023    MONOSABS 0.61 12/01/2023    EOSABS 0.02 12/01/2023    BASOSABS 0.05 12/01/2023    AUTOIGNUM 0.03 12/01/2023    NRBC 0.0 12/01/2023           Assessment / Plan      Assessment & Plan:  Diagnoses and all orders for this visit:    1. Multiple thyroid nodules (Primary)  Assessment & Plan:  She reports recent TSH was normal.  Neck u/s is stable.  I don't think this is contributing to the dysphagia.      2. Dysphagia, unspecified type  Assessment & Plan:  Thyroid is normal size and nodules are small and stable.  I don't think this is causing the dysphagia.  I suspect this may be related to GERD.  She will start taking PPI.  We discussed seeing gastroenterologist.   She will speak to her PCP about a referral closer to home.        Current Outpatient Medications   Medication Instructions    docusate sodium (COLACE) 250 mg, Oral, 2 Times Daily PRN    losartan (COZAAR) 25 mg, Daily PRN      Return in about 3 months (around 10/28/2025) for " Recheck with TSH.    Electronically signed by: Dino Rivera MD  07/28/2025

## 2025-07-28 NOTE — ASSESSMENT & PLAN NOTE
Thyroid is normal size and nodules are small and stable.  I don't think this is causing the dysphagia.  I suspect this may be related to GERD.  She will start taking PPI.  We discussed seeing gastroenterologist.   She will speak to her PCP about a referral closer to home.

## 2025-07-28 NOTE — ASSESSMENT & PLAN NOTE
She reports recent TSH was normal.  Neck u/s is stable.  I don't think this is contributing to the dysphagia.

## (undated) DEVICE — NDL HYPO ECLPS SFTY 18G 1 1/2IN

## (undated) DEVICE — ENCORE® LATEX MICRO SIZE 8, STERILE LATEX POWDER-FREE SURGICAL GLOVE: Brand: ENCORE

## (undated) DEVICE — HOLDER: Brand: DEROYAL

## (undated) DEVICE — PK LAP GEN 70

## (undated) DEVICE — Device

## (undated) DEVICE — OBT BLADLES ENDOWRIST DAVINCI/S 8MM

## (undated) DEVICE — SKIN AFFIX SURG ADHESIVE 72/CS 0.55ML: Brand: MEDLINE

## (undated) DEVICE — PK DAVINCI 70

## (undated) DEVICE — SUT MNCRYL 4/0 PS2 18 IN

## (undated) DEVICE — DRAPE,UTILTY,TAPE,15X26, 4EA/PK: Brand: MEDLINE

## (undated) DEVICE — LAPAROSCOPIC SCOPE WARMER: Brand: DEROYAL

## (undated) DEVICE — TUBING, SUCTION, 1/4" X 20', STRAIGHT: Brand: MEDLINE INDUSTRIES, INC.

## (undated) DEVICE — SYS CLOSE PORTII CARTR/THOMASN XL

## (undated) DEVICE — INSUFFLATION NEEDLE TO ESTABLISH PNEUMOPERITONEUM.: Brand: INSUFFLATION NEEDLE

## (undated) DEVICE — SOL ANTISTICK CAUTRY ELECTROLUBE LF

## (undated) DEVICE — [HIGH FLOW INSUFFLATOR,  DO NOT USE IF PACKAGE IS DAMAGED,  KEEP DRY,  KEEP AWAY FROM SUNLIGHT,  PROTECT FROM HEAT AND RADIOACTIVE SOURCES.]: Brand: PNEUMOSURE

## (undated) DEVICE — TISSUE RETRIEVAL SYSTEM: Brand: INZII RETRIEVAL SYSTEM

## (undated) DEVICE — COVER,TABLE,44X90,STERILE: Brand: MEDLINE

## (undated) DEVICE — SPNG LAP PREWSH SFTPK 18X18IN STRL PK/5

## (undated) DEVICE — CVR DISP HUG U VAC STEEP TREND

## (undated) DEVICE — NDL HYPO ECLPS SFTY 22G 1 1/2IN

## (undated) DEVICE — SUT VIC 0/0 UR6 27IN DYED J603H

## (undated) DEVICE — TROCAR: Brand: KII FIOS FIRST ENTRY

## (undated) DEVICE — ENDOPATH XCEL BLADELESS TROCARS WITH STABILITY SLEEVES: Brand: ENDOPATH XCEL

## (undated) DEVICE — ENCORE® LATEX MICRO SIZE 7.5, STERILE LATEX POWDER-FREE SURGICAL GLOVE: Brand: ENCORE

## (undated) DEVICE — PAD POSTN ARMBND 1P/U